# Patient Record
Sex: FEMALE | Race: ASIAN | Employment: FULL TIME | URBAN - METROPOLITAN AREA
[De-identification: names, ages, dates, MRNs, and addresses within clinical notes are randomized per-mention and may not be internally consistent; named-entity substitution may affect disease eponyms.]

---

## 2017-05-09 ENCOUNTER — PATIENT MESSAGE (OUTPATIENT)
Dept: FAMILY MEDICINE CLINIC | Facility: CLINIC | Age: 37
End: 2017-05-09

## 2017-05-09 DIAGNOSIS — E04.9 GOITER: Primary | ICD-10-CM

## 2017-05-09 NOTE — TELEPHONE ENCOUNTER
From: Ata Dunlap  To: Susanne Cox DO  Sent: 5/9/2017 7:27 AM CDT  Subject: Prescription Question    Hi Dr Katerine Casillas  I had stopped taking my thyroid medicine 2-3 months ago since I was feeling alright.  But now again I feel fatigued , low on energy etc .

## 2017-05-09 NOTE — TELEPHONE ENCOUNTER
From: Laury Graves  To: Kiet Garcia DO  Sent: 5/9/2017 12:01 PM CDT  Subject: Prescription Question    Hi Dr Siri Camara   I think nurse [facility] replied saying that I should not have stopped medication for thyroid.    You had told me when we started that we can t

## 2017-05-09 NOTE — TELEPHONE ENCOUNTER
Hi Dr Eleni Claude   I think nurse Mari Hathaway replied saying that I should not have stopped medication for thyroid. You had told me when we started that we can try to stop and see how it goes . So that's why I stopped .     Since I was not feeling alright that's why

## 2017-05-10 RX ORDER — LEVOTHYROXINE SODIUM 0.03 MG/1
TABLET ORAL
Qty: 90 TABLET | Refills: 3 | Status: SHIPPED | OUTPATIENT
Start: 2017-05-10 | End: 2018-05-17

## 2017-07-14 ENCOUNTER — PATIENT MESSAGE (OUTPATIENT)
Dept: FAMILY MEDICINE CLINIC | Facility: CLINIC | Age: 37
End: 2017-07-14

## 2017-07-14 NOTE — ADDENDUM NOTE
Encounter addended by: Chandni Lemus MA on: 7/14/2017  8:41 AM<BR>    Actions taken: Letter status changed

## 2017-07-17 NOTE — TELEPHONE ENCOUNTER
From: Duncan Holbrook  To: Brendon Delgado DO  Sent: 7/14/2017 5:12 PM CDT  Subject: Other    Hi Dr Cintia Sampson  I have my physical annual scheduled for next Thursday. What is the earliest next date available incase I need to reschedule. Let me know .   Thanks   P

## 2017-07-20 ENCOUNTER — OFFICE VISIT (OUTPATIENT)
Dept: FAMILY MEDICINE CLINIC | Facility: CLINIC | Age: 37
End: 2017-07-20

## 2017-07-20 VITALS
RESPIRATION RATE: 20 BRPM | HEIGHT: 63 IN | TEMPERATURE: 99 F | SYSTOLIC BLOOD PRESSURE: 100 MMHG | HEART RATE: 68 BPM | WEIGHT: 130 LBS | BODY MASS INDEX: 23.04 KG/M2 | DIASTOLIC BLOOD PRESSURE: 60 MMHG

## 2017-07-20 DIAGNOSIS — Z00.00 ANNUAL PHYSICAL EXAM: Primary | ICD-10-CM

## 2017-07-20 DIAGNOSIS — G44.009 CLUSTER HEADACHE, NOT INTRACTABLE, UNSPECIFIED CHRONICITY PATTERN: ICD-10-CM

## 2017-07-20 DIAGNOSIS — K30 INDIGESTION: ICD-10-CM

## 2017-07-20 PROCEDURE — 99395 PREV VISIT EST AGE 18-39: CPT | Performed by: FAMILY MEDICINE

## 2017-07-20 RX ORDER — FLUTICASONE PROPIONATE 50 MCG
2 SPRAY, SUSPENSION (ML) NASAL NIGHTLY
Qty: 1 BOTTLE | Refills: 0 | Status: SHIPPED | OUTPATIENT
Start: 2017-07-20 | End: 2017-09-13

## 2017-07-20 NOTE — PROGRESS NOTES
HPI:   Sarahi Osuna is a 29year old female who presents for a complete physical exam.     Wt Readings from Last 6 Encounters:  07/20/17 : 130 lb  08/12/16 : 125 lb  07/22/16 : 128 lb  04/01/16 : 133 lb  11/06/15 : 134 lb  08/14/15 : 134 lb    Body mass ind lesions  EYES:denies blurred vision or double vision  HEENT: denies nasal congestion, sinus pain or ST  LUNGS: denies shortness of breath with exertion  CARDIOVASCULAR: denies chest pain on exertion  GI: denies abdominal pain,denies heartburn  : denies d Indigestion  zanatac as needed / go gluten free         Discussed diet, exercise,calcium, vitamin D, fish oil and self breast exams. Questions answered and patient indicates understanding of these issues and agrees to the plan.   Follow up in 1 year or so

## 2017-07-22 ENCOUNTER — LABORATORY ENCOUNTER (OUTPATIENT)
Dept: LAB | Age: 37
End: 2017-07-22
Attending: FAMILY MEDICINE
Payer: COMMERCIAL

## 2017-07-22 DIAGNOSIS — E04.9 GOITER: ICD-10-CM

## 2017-07-22 DIAGNOSIS — Z00.00 ANNUAL PHYSICAL EXAM: ICD-10-CM

## 2017-07-22 LAB
25-HYDROXYVITAMIN D (TOTAL): 34.3 NG/ML (ref 30–100)
ALBUMIN SERPL-MCNC: 4.2 G/DL (ref 3.5–4.8)
ALP LIVER SERPL-CCNC: 65 U/L (ref 37–98)
ALT SERPL-CCNC: 25 U/L (ref 14–54)
ANTI-THYROGLOBULIN: <15 U/ML (ref ?–60)
ANTI-THYROPEROXIDASE: <28 U/ML (ref ?–60)
AST SERPL-CCNC: 17 U/L (ref 15–41)
BASOPHILS # BLD AUTO: 0.02 X10(3) UL (ref 0–0.1)
BASOPHILS NFR BLD AUTO: 0.5 %
BILIRUB SERPL-MCNC: 0.7 MG/DL (ref 0.1–2)
BUN BLD-MCNC: 12 MG/DL (ref 8–20)
CALCIUM BLD-MCNC: 9.4 MG/DL (ref 8.3–10.3)
CHLORIDE: 107 MMOL/L (ref 101–111)
CHOLEST SMN-MCNC: 177 MG/DL (ref ?–200)
CO2: 28 MMOL/L (ref 22–32)
CREAT BLD-MCNC: 0.72 MG/DL (ref 0.55–1.02)
EOSINOPHIL # BLD AUTO: 0.16 X10(3) UL (ref 0–0.3)
EOSINOPHIL NFR BLD AUTO: 3.6 %
ERYTHROCYTE [DISTWIDTH] IN BLOOD BY AUTOMATED COUNT: 13.7 % (ref 11.5–16)
FREE T4: 1.3 NG/DL (ref 0.9–1.8)
GLUCOSE BLD-MCNC: 78 MG/DL (ref 70–99)
HAV AB SERPL IA-ACNC: 398 PG/ML (ref 193–986)
HCT VFR BLD AUTO: 40.1 % (ref 34–50)
HDLC SERPL-MCNC: 68 MG/DL (ref 45–?)
HDLC SERPL: 2.6 {RATIO} (ref ?–4.44)
HGB BLD-MCNC: 12.8 G/DL (ref 12–16)
IMMATURE GRANULOCYTE COUNT: 0.01 X10(3) UL (ref 0–1)
IMMATURE GRANULOCYTE RATIO %: 0.2 %
LDLC SERPL CALC-MCNC: 100 MG/DL (ref ?–130)
LDLC SERPL-MCNC: 9 MG/DL (ref 5–40)
LYMPHOCYTES # BLD AUTO: 1.68 X10(3) UL (ref 0.9–4)
LYMPHOCYTES NFR BLD AUTO: 37.8 %
M PROTEIN MFR SERPL ELPH: 7.5 G/DL (ref 6.1–8.3)
MCH RBC QN AUTO: 27.9 PG (ref 27–33.2)
MCHC RBC AUTO-ENTMCNC: 31.9 G/DL (ref 31–37)
MCV RBC AUTO: 87.6 FL (ref 81–100)
MONOCYTES # BLD AUTO: 0.38 X10(3) UL (ref 0.1–0.6)
MONOCYTES NFR BLD AUTO: 8.6 %
NEUTROPHIL ABS PRELIM: 2.19 X10 (3) UL (ref 1.3–6.7)
NEUTROPHILS # BLD AUTO: 2.19 X10(3) UL (ref 1.3–6.7)
NEUTROPHILS NFR BLD AUTO: 49.3 %
NONHDLC SERPL-MCNC: 109 MG/DL (ref ?–130)
PLATELET # BLD AUTO: 258 10(3)UL (ref 150–450)
POTASSIUM SERPL-SCNC: 4.5 MMOL/L (ref 3.6–5.1)
RBC # BLD AUTO: 4.58 X10(6)UL (ref 3.8–5.1)
RED CELL DISTRIBUTION WIDTH-SD: 44.1 FL (ref 35.1–46.3)
SODIUM SERPL-SCNC: 141 MMOL/L (ref 136–144)
T3FREE SERPL-MCNC: 2.49 PG/ML (ref 2.3–4.2)
TRIGLYCERIDES: 45 MG/DL (ref ?–150)
TSI SER-ACNC: 3.23 MIU/ML (ref 0.35–5.5)
WBC # BLD AUTO: 4.4 X10(3) UL (ref 4–13)

## 2017-07-22 PROCEDURE — 84439 ASSAY OF FREE THYROXINE: CPT

## 2017-07-22 PROCEDURE — 86376 MICROSOMAL ANTIBODY EACH: CPT

## 2017-07-22 PROCEDURE — 82607 VITAMIN B-12: CPT

## 2017-07-22 PROCEDURE — 84443 ASSAY THYROID STIM HORMONE: CPT

## 2017-07-22 PROCEDURE — 36415 COLL VENOUS BLD VENIPUNCTURE: CPT

## 2017-07-22 PROCEDURE — 84481 FREE ASSAY (FT-3): CPT

## 2017-07-22 PROCEDURE — 86800 THYROGLOBULIN ANTIBODY: CPT

## 2017-07-22 PROCEDURE — 82306 VITAMIN D 25 HYDROXY: CPT

## 2017-07-22 PROCEDURE — 80053 COMPREHEN METABOLIC PANEL: CPT

## 2017-07-22 PROCEDURE — 80061 LIPID PANEL: CPT

## 2017-07-22 PROCEDURE — 85025 COMPLETE CBC W/AUTO DIFF WBC: CPT

## 2017-08-05 NOTE — LETTER
Date & Time: 2/10/2025, 6:34 PM  Patient: Audra Brown  Encounter Provider(s):    Mary Yang APRN       To Whom It May Concern:    Audra Brown was seen and treated in our department on 2/10/2025. She should not return to work until 02/12/2025 .    If you have any questions or concerns, please do not hesitate to call.        Mary Yang NP-C  Nurse Practitioner    This note has been electronically signed             
Home

## 2017-09-13 ENCOUNTER — OFFICE VISIT (OUTPATIENT)
Dept: FAMILY MEDICINE CLINIC | Facility: CLINIC | Age: 37
End: 2017-09-13

## 2017-09-13 VITALS
SYSTOLIC BLOOD PRESSURE: 102 MMHG | OXYGEN SATURATION: 97 % | DIASTOLIC BLOOD PRESSURE: 66 MMHG | HEART RATE: 87 BPM | WEIGHT: 130 LBS | HEIGHT: 63 IN | TEMPERATURE: 99 F | BODY MASS INDEX: 23.04 KG/M2 | RESPIRATION RATE: 18 BRPM

## 2017-09-13 DIAGNOSIS — J01.00 ACUTE MAXILLARY SINUSITIS, RECURRENCE NOT SPECIFIED: ICD-10-CM

## 2017-09-13 DIAGNOSIS — S46.811A STRAIN OF RIGHT TRAPEZIUS MUSCLE, INITIAL ENCOUNTER: Primary | ICD-10-CM

## 2017-09-13 DIAGNOSIS — R51.9 ACUTE NONINTRACTABLE HEADACHE, UNSPECIFIED HEADACHE TYPE: ICD-10-CM

## 2017-09-13 PROCEDURE — 99214 OFFICE O/P EST MOD 30 MIN: CPT | Performed by: PHYSICIAN ASSISTANT

## 2017-09-13 RX ORDER — NAPROXEN 500 MG/1
500 TABLET ORAL 2 TIMES DAILY WITH MEALS
Qty: 30 TABLET | Refills: 0 | Status: SHIPPED | OUTPATIENT
Start: 2017-09-13 | End: 2018-07-11

## 2017-09-13 RX ORDER — AMOXICILLIN AND CLAVULANATE POTASSIUM 875; 125 MG/1; MG/1
1 TABLET, FILM COATED ORAL 2 TIMES DAILY
Qty: 20 TABLET | Refills: 0 | Status: SHIPPED | OUTPATIENT
Start: 2017-09-13 | End: 2017-09-23

## 2017-09-13 RX ORDER — FLUTICASONE PROPIONATE 50 MCG
2 SPRAY, SUSPENSION (ML) NASAL NIGHTLY
Qty: 1 BOTTLE | Refills: 0 | Status: SHIPPED | OUTPATIENT
Start: 2017-09-13 | End: 2018-07-11

## 2017-09-13 RX ORDER — CYCLOBENZAPRINE HCL 5 MG
5 TABLET ORAL NIGHTLY
Qty: 10 TABLET | Refills: 0 | Status: SHIPPED | OUTPATIENT
Start: 2017-09-13 | End: 2018-07-11

## 2017-09-13 NOTE — PROGRESS NOTES
HPI:   Paz Ramirez is a 29year old female who presents for a headache. Had headache that started last night. Located back of her head; worse when she bends her head forward. Can feel a stretch in her neck and upper back.  Also has pain under both of her ea 15   08/15/2015 15   05/10/2014 15   ----------  ALT (U/L)   Date Value   05/10/2014 24   ----------  Alt (U/L)   Date Value   07/22/2017 25 08/12/2016 18   08/15/2015 20   ----------        Current Outpatient Prescriptions:  Levothyroxine Sodium 25 MCG 3, well developed, well nourished,in no apparent distress  CARDIO: RRR without murmur, radial pulse 2+ bilaterally  LUNGS: clear to auscultation  NECK: supple,no adenopathy, FROM cervical spine, +tenderness right trapezius muscle and at insertion points on relaxant. Ice or heat  Call in 2 days; sooner or go to the IC if headache worsens or changes. Questions answered and patient indicates understanding of these issues and agrees to the plan.   Call Friday for condition update or sooner if symptoms worsen

## 2017-09-15 ENCOUNTER — PATIENT MESSAGE (OUTPATIENT)
Dept: FAMILY MEDICINE CLINIC | Facility: CLINIC | Age: 37
End: 2017-09-15

## 2017-09-15 DIAGNOSIS — R51.9 INTRACTABLE HEADACHE, UNSPECIFIED CHRONICITY PATTERN, UNSPECIFIED HEADACHE TYPE: Primary | ICD-10-CM

## 2017-09-18 NOTE — TELEPHONE ENCOUNTER
Order MRI brain without contrast for headache. Go to the IC/ER if symptoms worsen or change. Follow up in 2 days.

## 2017-09-18 NOTE — TELEPHONE ENCOUNTER
From: Araceli Tovar  To: Rachel Clarke [place]  Sent: 9/15/2017 5:40 PM CDT  Subject: Visit Follow-up Question    Hi Dr Narcisa Gabriel    My aches at the back of head are a little better . But I m still not feeling 100%.  I feel heavy headed(on a scale of 1-10, I wou

## 2017-09-22 ENCOUNTER — PATIENT MESSAGE (OUTPATIENT)
Dept: FAMILY MEDICINE CLINIC | Facility: CLINIC | Age: 37
End: 2017-09-22

## 2017-09-22 NOTE — TELEPHONE ENCOUNTER
From: Jeniffer Batista  To: Paresh Ladd  Sent: 9/22/2017 10:16 AM CDT  Subject: Visit Follow-up Question    Hi Dr. Karen Moon     I am feeling much better now after this whole week of medicine.   I haven't got a chance to schedule MRI yet due to my work dayanna

## 2017-09-29 ENCOUNTER — HOSPITAL ENCOUNTER (OUTPATIENT)
Dept: MRI IMAGING | Age: 37
Discharge: HOME | End: 2017-09-29
Attending: PHYSICIAN ASSISTANT
Payer: COMMERCIAL

## 2017-09-29 ENCOUNTER — PATIENT MESSAGE (OUTPATIENT)
Dept: FAMILY MEDICINE CLINIC | Facility: CLINIC | Age: 37
End: 2017-09-29

## 2017-09-29 DIAGNOSIS — R51.9 INTRACTABLE HEADACHE, UNSPECIFIED CHRONICITY PATTERN, UNSPECIFIED HEADACHE TYPE: ICD-10-CM

## 2017-09-29 PROCEDURE — 70551 MRI BRAIN STEM W/O DYE: CPT | Performed by: PHYSICIAN ASSISTANT

## 2017-10-02 NOTE — TELEPHONE ENCOUNTER
From: Elke Amin  To: WILLEM Varner  Sent: 9/29/2017 4:52 PM CDT  Subject: Visit Follow-up Question    I have a question about MRI BRAIN (WITHOUT CONTRAST) (CPT=70551) resulted on 9/29/17, 2:28 PM.    What should I do about headaches then ?   Advil

## 2017-10-12 ENCOUNTER — PATIENT MESSAGE (OUTPATIENT)
Dept: FAMILY MEDICINE CLINIC | Facility: CLINIC | Age: 37
End: 2017-10-12

## 2017-10-13 NOTE — TELEPHONE ENCOUNTER
From: Alicia Santiago  To: WILLEM Zapata  Sent: 10/12/2017 4:44 PM CDT  Subject: Test Results Question    I have a question about MRI BRAIN (WITHOUT CONTRAST) (CPT=70551) resulted on 9/29/17, 2:28 PM.    Hi Dr Dom Adamson   I need a written explanation from

## 2017-10-13 NOTE — TELEPHONE ENCOUNTER
Already spoke with patient's insurance and did peer to peer regarding this to get it approved. Did MRI due to occipital headache; wanted to evaluate for AVM or other neurovascular causes of her headache.

## 2018-05-17 DIAGNOSIS — E04.9 GOITER: ICD-10-CM

## 2018-05-17 RX ORDER — LEVOTHYROXINE SODIUM 0.03 MG/1
TABLET ORAL
Qty: 90 TABLET | Refills: 1 | Status: SHIPPED | OUTPATIENT
Start: 2018-05-17 | End: 2018-08-03

## 2018-07-11 ENCOUNTER — HOSPITAL ENCOUNTER (OUTPATIENT)
Dept: GENERAL RADIOLOGY | Age: 38
Discharge: HOME | End: 2018-07-11
Attending: PHYSICIAN ASSISTANT
Payer: COMMERCIAL

## 2018-07-11 ENCOUNTER — LAB ENCOUNTER (OUTPATIENT)
Dept: LAB | Age: 38
End: 2018-07-11
Attending: PHYSICIAN ASSISTANT
Payer: COMMERCIAL

## 2018-07-11 ENCOUNTER — TELEPHONE (OUTPATIENT)
Dept: FAMILY MEDICINE CLINIC | Facility: CLINIC | Age: 38
End: 2018-07-11

## 2018-07-11 ENCOUNTER — OFFICE VISIT (OUTPATIENT)
Dept: FAMILY MEDICINE CLINIC | Facility: CLINIC | Age: 38
End: 2018-07-11

## 2018-07-11 VITALS
OXYGEN SATURATION: 99 % | WEIGHT: 132 LBS | HEART RATE: 68 BPM | RESPIRATION RATE: 18 BRPM | TEMPERATURE: 98 F | HEIGHT: 63 IN | DIASTOLIC BLOOD PRESSURE: 60 MMHG | BODY MASS INDEX: 23.39 KG/M2 | SYSTOLIC BLOOD PRESSURE: 104 MMHG

## 2018-07-11 DIAGNOSIS — R10.9 RIGHT SIDED ABDOMINAL PAIN: ICD-10-CM

## 2018-07-11 DIAGNOSIS — R14.0 BLOATING: ICD-10-CM

## 2018-07-11 DIAGNOSIS — K59.09 OTHER CONSTIPATION: ICD-10-CM

## 2018-07-11 DIAGNOSIS — R10.9 RIGHT SIDED ABDOMINAL PAIN: Primary | ICD-10-CM

## 2018-07-11 DIAGNOSIS — R10.2 PELVIC PAIN IN FEMALE: ICD-10-CM

## 2018-07-11 LAB
ALBUMIN SERPL-MCNC: 3.9 G/DL (ref 3.5–4.8)
ALP LIVER SERPL-CCNC: 67 U/L (ref 37–98)
ALT SERPL-CCNC: 24 U/L (ref 14–54)
APPEARANCE: CLEAR
AST SERPL-CCNC: 20 U/L (ref 15–41)
BASOPHILS # BLD AUTO: 0.02 X10(3) UL (ref 0–0.1)
BASOPHILS NFR BLD AUTO: 0.3 %
BILIRUB SERPL-MCNC: 0.4 MG/DL (ref 0.1–2)
BILIRUBIN: NEGATIVE
BUN BLD-MCNC: 11 MG/DL (ref 8–20)
CALCIUM BLD-MCNC: 8.9 MG/DL (ref 8.3–10.3)
CHLORIDE: 103 MMOL/L (ref 101–111)
CO2: 27 MMOL/L (ref 22–32)
CREAT BLD-MCNC: 0.76 MG/DL (ref 0.55–1.02)
EOSINOPHIL # BLD AUTO: 0.22 X10(3) UL (ref 0–0.3)
EOSINOPHIL NFR BLD AUTO: 3.3 %
ERYTHROCYTE [DISTWIDTH] IN BLOOD BY AUTOMATED COUNT: 12.9 % (ref 11.5–16)
GLUCOSE (URINE DIPSTICK): NEGATIVE MG/DL
GLUCOSE BLD-MCNC: 82 MG/DL (ref 70–99)
HCT VFR BLD AUTO: 39.7 % (ref 34–50)
HGB BLD-MCNC: 12.5 G/DL (ref 12–16)
IMMATURE GRANULOCYTE COUNT: 0.02 X10(3) UL (ref 0–1)
IMMATURE GRANULOCYTE RATIO %: 0.3 %
KETONES (URINE DIPSTICK): NEGATIVE MG/DL
LEUKOCYTES: NEGATIVE
LIPASE: 141 U/L (ref 73–393)
LYMPHOCYTES # BLD AUTO: 2.08 X10(3) UL (ref 0.9–4)
LYMPHOCYTES NFR BLD AUTO: 31.7 %
M PROTEIN MFR SERPL ELPH: 7.5 G/DL (ref 6.1–8.3)
MCH RBC QN AUTO: 28.2 PG (ref 27–33.2)
MCHC RBC AUTO-ENTMCNC: 31.5 G/DL (ref 31–37)
MCV RBC AUTO: 89.4 FL (ref 81–100)
MONOCYTES # BLD AUTO: 0.54 X10(3) UL (ref 0.1–1)
MONOCYTES NFR BLD AUTO: 8.2 %
MULTISTIX LOT#: NORMAL NUMERIC
NEUTROPHIL ABS PRELIM: 3.69 X10 (3) UL (ref 1.3–6.7)
NEUTROPHILS # BLD AUTO: 3.69 X10(3) UL (ref 1.3–6.7)
NEUTROPHILS NFR BLD AUTO: 56.2 %
NITRITE, URINE: NEGATIVE
OCCULT BLOOD: NEGATIVE
PH, URINE: 5.5 (ref 4.5–8)
PLATELET # BLD AUTO: 273 10(3)UL (ref 150–450)
POTASSIUM SERPL-SCNC: 4.3 MMOL/L (ref 3.6–5.1)
PROTEIN (URINE DIPSTICK): NEGATIVE MG/DL
RBC # BLD AUTO: 4.44 X10(6)UL (ref 3.8–5.1)
RED CELL DISTRIBUTION WIDTH-SD: 42.5 FL (ref 35.1–46.3)
SODIUM SERPL-SCNC: 136 MMOL/L (ref 136–144)
SPECIFIC GRAVITY: 1.02 (ref 1–1.03)
TSI SER-ACNC: 3.37 MIU/ML (ref 0.35–5.5)
URINE-COLOR: YELLOW
UROBILINOGEN,SEMI-QN: 0.2 MG/DL (ref 0–1.9)
WBC # BLD AUTO: 6.6 X10(3) UL (ref 4–13)

## 2018-07-11 PROCEDURE — 74018 RADEX ABDOMEN 1 VIEW: CPT | Performed by: PHYSICIAN ASSISTANT

## 2018-07-11 PROCEDURE — 36415 COLL VENOUS BLD VENIPUNCTURE: CPT | Performed by: PHYSICIAN ASSISTANT

## 2018-07-11 PROCEDURE — 83690 ASSAY OF LIPASE: CPT | Performed by: PHYSICIAN ASSISTANT

## 2018-07-11 PROCEDURE — 99214 OFFICE O/P EST MOD 30 MIN: CPT | Performed by: PHYSICIAN ASSISTANT

## 2018-07-11 PROCEDURE — 81003 URINALYSIS AUTO W/O SCOPE: CPT | Performed by: PHYSICIAN ASSISTANT

## 2018-07-11 PROCEDURE — 80050 GENERAL HEALTH PANEL: CPT | Performed by: PHYSICIAN ASSISTANT

## 2018-07-11 NOTE — PROGRESS NOTES
HPI:   Steve Ferrer is a 40year old female who presents for pelvic pain. Pt is established with gynecology and her last visit was in May of this year. Has had pelvic pain for 2-3 weeks. No urinary symptoms. Denies vaginal discharge.  States her pain is on h ----------        Current Outpatient Prescriptions:  LEVOTHYROXINE SODIUM 25 MCG Oral Tab TAKE 1 TABLET BY MOUTH DAILY BEFORE BREAKFAST Disp: 90 tablet Rfl: 1   Cholecalciferol (VITAMIN D3) 70908 UNITS Oral Cap Take 1 tablet by mouth.  Disp:  Rfl:    Lidia ecchymosis, no swelling, no inguinal adenopathy, declined pelvic exam    ASSESSMENT AND PLAN:   Jonas Tubbs is a 40year old female  1.  Right sided abdominal pain  Urine unremarkable for urinary tract infection  Labs ordered  Xray today  ultrasound ordered

## 2018-07-16 ENCOUNTER — HOSPITAL ENCOUNTER (OUTPATIENT)
Dept: ULTRASOUND IMAGING | Age: 38
Discharge: HOME | End: 2018-07-16
Attending: PHYSICIAN ASSISTANT
Payer: COMMERCIAL

## 2018-07-16 DIAGNOSIS — R10.9 RIGHT SIDED ABDOMINAL PAIN: ICD-10-CM

## 2018-07-16 PROCEDURE — 76700 US EXAM ABDOM COMPLETE: CPT | Performed by: PHYSICIAN ASSISTANT

## 2018-08-03 ENCOUNTER — OFFICE VISIT (OUTPATIENT)
Dept: FAMILY MEDICINE CLINIC | Facility: CLINIC | Age: 38
End: 2018-08-03
Payer: COMMERCIAL

## 2018-08-03 VITALS
SYSTOLIC BLOOD PRESSURE: 102 MMHG | WEIGHT: 134 LBS | BODY MASS INDEX: 24.04 KG/M2 | DIASTOLIC BLOOD PRESSURE: 60 MMHG | OXYGEN SATURATION: 99 % | RESPIRATION RATE: 20 BRPM | HEART RATE: 68 BPM | HEIGHT: 62.5 IN | TEMPERATURE: 99 F

## 2018-08-03 DIAGNOSIS — Z00.00 ANNUAL PHYSICAL EXAM: ICD-10-CM

## 2018-08-03 DIAGNOSIS — E55.9 VITAMIN D DEFICIENCY: Primary | ICD-10-CM

## 2018-08-03 DIAGNOSIS — E03.9 HYPOTHYROIDISM, UNSPECIFIED TYPE: ICD-10-CM

## 2018-08-03 DIAGNOSIS — K59.09 CHRONIC CONSTIPATION: ICD-10-CM

## 2018-08-03 PROBLEM — G44.009 CLUSTER HEADACHE, NOT INTRACTABLE: Status: RESOLVED | Noted: 2017-07-20 | Resolved: 2018-08-03

## 2018-08-03 PROCEDURE — 99395 PREV VISIT EST AGE 18-39: CPT | Performed by: FAMILY MEDICINE

## 2018-08-03 RX ORDER — LEVOTHYROXINE SODIUM 0.03 MG/1
TABLET ORAL
Qty: 90 TABLET | Refills: 3 | Status: SHIPPED | OUTPATIENT
Start: 2018-08-03 | End: 2021-02-15 | Stop reason: DRUGHIGH

## 2018-08-03 NOTE — PROGRESS NOTES
HPI:   Elke Amin is a 40year old female who presents for a complete physical exam.     Wt Readings from Last 6 Encounters:  08/03/18 : 134 lb  07/11/18 : 132 lb  09/13/17 : 130 lb  07/20/17 : 130 lb  08/12/16 : 125 lb  07/22/16 : 128 lb    Body mass ind nasal congestion, sinus pain or ST  LUNGS: denies shortness of breath with exertion  CARDIOVASCULAR: denies chest pain on exertion  GI: denies abdominal pain,denies heartburn  : denies dysuria, vaginal discharge or itching,periods regular   MUSCULOSKELET

## 2018-08-21 ENCOUNTER — APPOINTMENT (OUTPATIENT)
Dept: LAB | Age: 38
End: 2018-08-21
Attending: FAMILY MEDICINE
Payer: COMMERCIAL

## 2018-08-21 ENCOUNTER — TELEPHONE (OUTPATIENT)
Dept: FAMILY MEDICINE CLINIC | Facility: CLINIC | Age: 38
End: 2018-08-21

## 2018-08-21 DIAGNOSIS — Z00.00 ANNUAL PHYSICAL EXAM: ICD-10-CM

## 2018-08-21 LAB
CHOLEST SMN-MCNC: 165 MG/DL (ref ?–200)
HAV AB SERPL IA-ACNC: 270 PG/ML (ref 193–986)
HDLC SERPL-MCNC: 57 MG/DL (ref 40–59)
LDLC SERPL CALC-MCNC: 95 MG/DL (ref ?–100)
NONHDLC SERPL-MCNC: 108 MG/DL (ref ?–130)
TRIGL SERPL-MCNC: 63 MG/DL (ref 30–149)
VIT D+METAB SERPL-MCNC: 27 NG/ML (ref 30–100)
VLDLC SERPL CALC-MCNC: 13 MG/DL (ref 0–30)

## 2018-08-21 PROCEDURE — 82306 VITAMIN D 25 HYDROXY: CPT | Performed by: FAMILY MEDICINE

## 2018-08-21 PROCEDURE — 82607 VITAMIN B-12: CPT | Performed by: FAMILY MEDICINE

## 2018-08-21 PROCEDURE — 36415 COLL VENOUS BLD VENIPUNCTURE: CPT | Performed by: FAMILY MEDICINE

## 2018-08-21 PROCEDURE — 80061 LIPID PANEL: CPT | Performed by: FAMILY MEDICINE

## 2018-08-24 ENCOUNTER — PATIENT MESSAGE (OUTPATIENT)
Dept: FAMILY MEDICINE CLINIC | Facility: CLINIC | Age: 38
End: 2018-08-24

## 2018-08-27 NOTE — TELEPHONE ENCOUNTER
To confirm,  Is it okay to wait only a half hour after taking thyroid med and eating? How long should pt wait before taking other vitamins?

## 2018-08-27 NOTE — TELEPHONE ENCOUNTER
From: Tanner Berrios  To: Aayush Gray DO  Sent: 8/24/2018 5:15 PM CDT  Subject: Test Results Question    Hi Dr Marco A Coats    I have been missing on my vitamins. I take it once or twice a month that's it. Sorry about that.  I will start it back again from [facility]

## 2020-01-31 ENCOUNTER — OFFICE VISIT (OUTPATIENT)
Dept: FAMILY MEDICINE CLINIC | Facility: CLINIC | Age: 40
End: 2020-01-31
Payer: COMMERCIAL

## 2020-01-31 VITALS
HEART RATE: 64 BPM | BODY MASS INDEX: 26.38 KG/M2 | DIASTOLIC BLOOD PRESSURE: 82 MMHG | TEMPERATURE: 99 F | RESPIRATION RATE: 16 BRPM | WEIGHT: 147 LBS | OXYGEN SATURATION: 98 % | SYSTOLIC BLOOD PRESSURE: 122 MMHG | HEIGHT: 62.5 IN

## 2020-01-31 DIAGNOSIS — Z00.00 ANNUAL PHYSICAL EXAM: Primary | ICD-10-CM

## 2020-01-31 DIAGNOSIS — R68.89 FLU-LIKE SYMPTOMS: ICD-10-CM

## 2020-01-31 PROCEDURE — 99395 PREV VISIT EST AGE 18-39: CPT | Performed by: FAMILY MEDICINE

## 2020-01-31 RX ORDER — OSELTAMIVIR PHOSPHATE 75 MG/1
75 CAPSULE ORAL 2 TIMES DAILY
Qty: 10 CAPSULE | Refills: 0 | Status: SHIPPED | OUTPATIENT
Start: 2020-01-31 | End: 2020-10-09 | Stop reason: ALTCHOICE

## 2020-01-31 RX ORDER — PNV NO.95/FERROUS FUM/FOLIC AC 28MG-0.8MG
325 TABLET ORAL
COMMUNITY
End: 2020-10-09 | Stop reason: ALTCHOICE

## 2020-01-31 NOTE — PROGRESS NOTES
HPI:   Doug Clayton is a 46year old female who presents for a complete physical exam.     Wt Readings from Last 6 Encounters:  01/31/20 : 147 lb (66.7 kg)  08/03/18 : 134 lb (60.8 kg)  07/11/18 : 132 lb (59.9 kg)  09/13/17 : 130 lb (59 kg)  07/20/17 : 130 Never Smoker      Smokeless tobacco: Never Used    Alcohol use: Yes    Drug use: No    Occ:. :  Children: 3   /  Going back in 2 weeks   Exercise: none.   Diet: watches calories closely     REVIEW OF SYSTEMS:   GENERAL: feels well oth Future  - Oseltamivir Phosphate (TAMIFLU) 75 MG Oral Cap; Take 1 capsule (75 mg total) by mouth 2 (two) times daily. Dispense: 10 capsule; Refill: 0        Discussed diet, exercise,calcium, vitamin D, fish oil and self breast exams.    Questions answered a

## 2020-02-01 ENCOUNTER — PATIENT MESSAGE (OUTPATIENT)
Dept: FAMILY MEDICINE CLINIC | Facility: CLINIC | Age: 40
End: 2020-02-01

## 2020-02-01 LAB
Lab: NOT DETECTED

## 2020-02-03 NOTE — TELEPHONE ENCOUNTER
From: Luis Joseph  To: Kenny Nathan DO  Sent: 2/1/2020 11:40 AM CST  Subject: Test Results Question    I have a question about TEST IN QUESTION - MISC QUESTION resulted on 2/1/20, 6:54 AM.    Hi Dr. Patricia Garcia  I can't understand the test results in my chart

## 2020-02-04 LAB
ABSOLUTE BASOPHILS: 20 CELLS/UL (ref 0–200)
ABSOLUTE EOSINOPHILS: 348 CELLS/UL (ref 15–500)
ABSOLUTE LYMPHOCYTES: 1940 CELLS/UL (ref 850–3900)
ABSOLUTE MONOCYTES: 510 CELLS/UL (ref 200–950)
ABSOLUTE NEUTROPHILS: 2083 CELLS/UL (ref 1500–7800)
ALBUMIN/GLOBULIN RATIO: 1.7 (CALC) (ref 1–2.5)
ALBUMIN: 4.1 G/DL (ref 3.6–5.1)
ALKALINE PHOSPHATASE: 101 U/L (ref 31–125)
ALT: 27 U/L (ref 6–29)
AST: 19 U/L (ref 10–30)
BASOPHILS: 0.4 %
BILIRUBIN, TOTAL: 0.5 MG/DL (ref 0.2–1.2)
BUN: 17 MG/DL (ref 7–25)
CALCIUM: 9.5 MG/DL (ref 8.6–10.2)
CARBON DIOXIDE: 27 MMOL/L (ref 20–32)
CHLORIDE: 103 MMOL/L (ref 98–110)
CHOL/HDLC RATIO: 2.5 (CALC)
CHOLESTEROL, TOTAL: 175 MG/DL
CREATININE: 0.71 MG/DL (ref 0.5–1.1)
EGFR IF AFRICN AM: 125 ML/MIN/1.73M2
EGFR IF NONAFRICN AM: 108 ML/MIN/1.73M2
EOSINOPHILS: 7.1 %
GLOBULIN: 2.4 G/DL (CALC) (ref 1.9–3.7)
GLUCOSE: 79 MG/DL (ref 65–99)
HDL CHOLESTEROL: 70 MG/DL
HEMATOCRIT: 40.6 % (ref 35–45)
HEMOGLOBIN: 13.8 G/DL (ref 11.7–15.5)
LDL-CHOLESTEROL: 91 MG/DL (CALC)
LYMPHOCYTES: 39.6 %
MCH: 30.5 PG (ref 27–33)
MCHC: 34 G/DL (ref 32–36)
MCV: 89.6 FL (ref 80–100)
MONOCYTES: 10.4 %
MPV: 10.5 FL (ref 7.5–12.5)
NEUTROPHILS: 42.5 %
NON-HDL CHOLESTEROL: 105 MG/DL (CALC)
PLATELET COUNT: 252 THOUSAND/UL (ref 140–400)
POTASSIUM: 4.5 MMOL/L (ref 3.5–5.3)
PROTEIN, TOTAL: 6.5 G/DL (ref 6.1–8.1)
RDW: 11.8 % (ref 11–15)
RED BLOOD CELL COUNT: 4.53 MILLION/UL (ref 3.8–5.1)
SODIUM: 138 MMOL/L (ref 135–146)
T4, FREE: 1.3 NG/DL (ref 0.8–1.8)
TRIGLYCERIDES: 53 MG/DL
TSH: 2.2 MIU/L
VITAMIN B12: 875 PG/ML (ref 200–1100)
VITAMIN D, 25-OH, TOTAL: 39 NG/ML (ref 30–100)
WHITE BLOOD CELL COUNT: 4.9 THOUSAND/UL (ref 3.8–10.8)

## 2021-02-04 ENCOUNTER — OFFICE VISIT (OUTPATIENT)
Dept: FAMILY MEDICINE CLINIC | Facility: CLINIC | Age: 41
End: 2021-02-04
Payer: COMMERCIAL

## 2021-02-04 VITALS
SYSTOLIC BLOOD PRESSURE: 100 MMHG | DIASTOLIC BLOOD PRESSURE: 60 MMHG | HEIGHT: 62 IN | BODY MASS INDEX: 25.58 KG/M2 | OXYGEN SATURATION: 99 % | RESPIRATION RATE: 18 BRPM | WEIGHT: 139 LBS | HEART RATE: 91 BPM

## 2021-02-04 DIAGNOSIS — Z00.00 ANNUAL PHYSICAL EXAM: Primary | ICD-10-CM

## 2021-02-04 DIAGNOSIS — Z12.31 ENCOUNTER FOR SCREENING MAMMOGRAM FOR HIGH-RISK PATIENT: ICD-10-CM

## 2021-02-04 PROCEDURE — 3008F BODY MASS INDEX DOCD: CPT | Performed by: FAMILY MEDICINE

## 2021-02-04 PROCEDURE — 99395 PREV VISIT EST AGE 18-39: CPT | Performed by: FAMILY MEDICINE

## 2021-02-04 PROCEDURE — 3074F SYST BP LT 130 MM HG: CPT | Performed by: FAMILY MEDICINE

## 2021-02-04 PROCEDURE — 3078F DIAST BP <80 MM HG: CPT | Performed by: FAMILY MEDICINE

## 2021-02-04 NOTE — PROGRESS NOTES
HPI:   Anthony Beckford is a 45year old female who presents for a complete physical exam.     Wt Readings from Last 6 Encounters:  02/04/21 : 139 lb (63 kg)  01/31/20 : 147 lb (66.7 kg)  08/03/18 : 134 lb (60.8 kg)  07/11/18 : 132 lb (59.9 kg)  09/13/17 : 130 closely     REVIEW OF SYSTEMS:   GENERAL: feels well otherwise  SKIN: denies any unusual skin lesions  EYES:denies blurred vision or double vision  HEENT: denies nasal congestion, sinus pain or ST  LUNGS: denies shortness of breath with exertion  CARDIOVAS indicates understanding of these issues and agrees to the plan. Follow up in 1 year or sooner if needed.

## 2021-02-12 LAB
ABSOLUTE BASOPHILS: 22 CELLS/UL (ref 0–200)
ABSOLUTE EOSINOPHILS: 259 CELLS/UL (ref 15–500)
ABSOLUTE LYMPHOCYTES: 2192 CELLS/UL (ref 850–3900)
ABSOLUTE MONOCYTES: 454 CELLS/UL (ref 200–950)
ABSOLUTE NEUTROPHILS: 2473 CELLS/UL (ref 1500–7800)
ALBUMIN/GLOBULIN RATIO: 1.8 (CALC) (ref 1–2.5)
ALBUMIN: 4.3 G/DL (ref 3.6–5.1)
ALKALINE PHOSPHATASE: 68 U/L (ref 31–125)
ALT: 13 U/L (ref 6–29)
AST: 15 U/L (ref 10–30)
BASOPHILS: 0.4 %
BILIRUBIN, TOTAL: 0.9 MG/DL (ref 0.2–1.2)
BUN: 14 MG/DL (ref 7–25)
CALCIUM: 9.4 MG/DL (ref 8.6–10.2)
CARBON DIOXIDE: 26 MMOL/L (ref 20–32)
CHLORIDE: 104 MMOL/L (ref 98–110)
CHOL/HDLC RATIO: 3.1 (CALC)
CHOLESTEROL, TOTAL: 166 MG/DL
CREATININE: 0.75 MG/DL (ref 0.5–1.1)
EGFR IF AFRICN AM: 116 ML/MIN/1.73M2
EGFR IF NONAFRICN AM: 100 ML/MIN/1.73M2
EOSINOPHILS: 4.8 %
GLOBULIN: 2.4 G/DL (CALC) (ref 1.9–3.7)
GLUCOSE: 84 MG/DL (ref 65–99)
HDL CHOLESTEROL: 54 MG/DL
HEMATOCRIT: 40.2 % (ref 35–45)
HEMOGLOBIN: 13.1 G/DL (ref 11.7–15.5)
LDL-CHOLESTEROL: 97 MG/DL (CALC)
LYMPHOCYTES: 40.6 %
MCH: 28.6 PG (ref 27–33)
MCHC: 32.6 G/DL (ref 32–36)
MCV: 87.8 FL (ref 80–100)
MONOCYTES: 8.4 %
MPV: 11.2 FL (ref 7.5–12.5)
NEUTROPHILS: 45.8 %
NON-HDL CHOLESTEROL: 112 MG/DL (CALC)
PLATELET COUNT: 260 THOUSAND/UL (ref 140–400)
POTASSIUM: 4.8 MMOL/L (ref 3.5–5.3)
PROTEIN, TOTAL: 6.7 G/DL (ref 6.1–8.1)
RDW: 12.5 % (ref 11–15)
RED BLOOD CELL COUNT: 4.58 MILLION/UL (ref 3.8–5.1)
SODIUM: 137 MMOL/L (ref 135–146)
T4, FREE: 1.4 NG/DL (ref 0.8–1.8)
TRIGLYCERIDES: 65 MG/DL
TSH: 7.45 MIU/L
VITAMIN B12: 590 PG/ML (ref 200–1100)
VITAMIN D, 25-OH, TOTAL: 22 NG/ML (ref 30–100)
WHITE BLOOD CELL COUNT: 5.4 THOUSAND/UL (ref 3.8–10.8)

## 2021-04-13 RX ORDER — LEVOTHYROXINE SODIUM 0.07 MG/1
75 TABLET ORAL
Qty: 30 TABLET | Refills: 1 | Status: SHIPPED | OUTPATIENT
Start: 2021-04-13 | End: 2021-04-23

## 2021-04-21 ENCOUNTER — LAB ENCOUNTER (OUTPATIENT)
Dept: LAB | Age: 41
End: 2021-04-21
Attending: FAMILY MEDICINE
Payer: COMMERCIAL

## 2021-04-21 DIAGNOSIS — E03.8 OTHER SPECIFIED HYPOTHYROIDISM: ICD-10-CM

## 2021-04-21 DIAGNOSIS — Z00.00 ANNUAL PHYSICAL EXAM: ICD-10-CM

## 2021-04-21 PROCEDURE — 36415 COLL VENOUS BLD VENIPUNCTURE: CPT

## 2021-04-21 PROCEDURE — 80053 COMPREHEN METABOLIC PANEL: CPT

## 2021-04-21 PROCEDURE — 82306 VITAMIN D 25 HYDROXY: CPT

## 2021-04-21 PROCEDURE — 82607 VITAMIN B-12: CPT

## 2021-04-21 PROCEDURE — 84439 ASSAY OF FREE THYROXINE: CPT

## 2021-04-21 PROCEDURE — 80061 LIPID PANEL: CPT

## 2021-04-21 PROCEDURE — 84443 ASSAY THYROID STIM HORMONE: CPT

## 2021-04-21 PROCEDURE — 85025 COMPLETE CBC W/AUTO DIFF WBC: CPT

## 2021-06-14 ENCOUNTER — TELEPHONE (OUTPATIENT)
Dept: FAMILY MEDICINE CLINIC | Facility: CLINIC | Age: 41
End: 2021-06-14

## 2021-06-14 RX ORDER — LEVOTHYROXINE SODIUM 0.07 MG/1
75 TABLET ORAL
Qty: 30 TABLET | Refills: 0 | Status: SHIPPED | OUTPATIENT
Start: 2021-06-14 | End: 2021-06-24 | Stop reason: DRUGHIGH

## 2021-06-17 ENCOUNTER — LAB ENCOUNTER (OUTPATIENT)
Dept: LAB | Age: 41
End: 2021-06-17
Attending: FAMILY MEDICINE
Payer: COMMERCIAL

## 2021-06-17 DIAGNOSIS — E03.8 OTHER SPECIFIED HYPOTHYROIDISM: ICD-10-CM

## 2021-06-17 PROCEDURE — 84443 ASSAY THYROID STIM HORMONE: CPT

## 2021-06-17 PROCEDURE — 84439 ASSAY OF FREE THYROXINE: CPT

## 2021-06-17 PROCEDURE — 36415 COLL VENOUS BLD VENIPUNCTURE: CPT

## 2021-09-07 ENCOUNTER — LAB ENCOUNTER (OUTPATIENT)
Dept: LAB | Age: 41
End: 2021-09-07
Attending: FAMILY MEDICINE
Payer: COMMERCIAL

## 2021-09-07 DIAGNOSIS — E03.8 OTHER SPECIFIED HYPOTHYROIDISM: ICD-10-CM

## 2021-09-07 LAB
T4 FREE SERPL-MCNC: 1.1 NG/DL (ref 0.8–1.7)
TSI SER-ACNC: 8.21 MIU/ML (ref 0.36–3.74)

## 2021-09-07 PROCEDURE — 84439 ASSAY OF FREE THYROXINE: CPT

## 2021-09-07 PROCEDURE — 84443 ASSAY THYROID STIM HORMONE: CPT

## 2021-09-07 PROCEDURE — 36415 COLL VENOUS BLD VENIPUNCTURE: CPT

## 2021-10-12 ENCOUNTER — LAB ENCOUNTER (OUTPATIENT)
Dept: LAB | Age: 41
End: 2021-10-12
Attending: FAMILY MEDICINE
Payer: COMMERCIAL

## 2021-10-12 DIAGNOSIS — E03.8 OTHER SPECIFIED HYPOTHYROIDISM: ICD-10-CM

## 2021-10-12 PROCEDURE — 84443 ASSAY THYROID STIM HORMONE: CPT

## 2021-10-12 PROCEDURE — 84439 ASSAY OF FREE THYROXINE: CPT

## 2021-10-12 PROCEDURE — 36415 COLL VENOUS BLD VENIPUNCTURE: CPT

## 2021-12-15 RX ORDER — LEVOTHYROXINE SODIUM 0.07 MG/1
TABLET ORAL
Qty: 30 TABLET | Refills: 1 | Status: SHIPPED | OUTPATIENT
Start: 2021-12-15

## 2022-01-29 ENCOUNTER — HOSPITAL ENCOUNTER (OUTPATIENT)
Dept: MAMMOGRAPHY | Age: 42
Discharge: HOME | End: 2022-01-29
Attending: FAMILY MEDICINE
Payer: COMMERCIAL

## 2022-01-29 DIAGNOSIS — Z12.31 ENCOUNTER FOR SCREENING MAMMOGRAM FOR HIGH-RISK PATIENT: ICD-10-CM

## 2022-01-29 PROCEDURE — 77067 SCR MAMMO BI INCL CAD: CPT | Performed by: FAMILY MEDICINE

## 2022-01-29 PROCEDURE — 77063 BREAST TOMOSYNTHESIS BI: CPT | Performed by: FAMILY MEDICINE

## 2022-02-14 RX ORDER — LEVOTHYROXINE SODIUM 0.07 MG/1
TABLET ORAL
Qty: 30 TABLET | Refills: 1 | Status: SHIPPED | OUTPATIENT
Start: 2022-02-14

## 2022-02-14 NOTE — TELEPHONE ENCOUNTER
Next Appt:    With [facility] Ema Ards, DO)  03/08/2022 at 7:30 AM    LV 2-4-21    LR 12-15-21

## 2022-03-08 ENCOUNTER — OFFICE VISIT (OUTPATIENT)
Dept: FAMILY MEDICINE CLINIC | Facility: CLINIC | Age: 42
End: 2022-03-08
Payer: COMMERCIAL

## 2022-03-08 VITALS
SYSTOLIC BLOOD PRESSURE: 110 MMHG | TEMPERATURE: 98 F | OXYGEN SATURATION: 99 % | HEART RATE: 68 BPM | WEIGHT: 143 LBS | BODY MASS INDEX: 25.66 KG/M2 | RESPIRATION RATE: 18 BRPM | HEIGHT: 62.5 IN | DIASTOLIC BLOOD PRESSURE: 68 MMHG

## 2022-03-08 DIAGNOSIS — D64.9 LOW HEMOGLOBIN: ICD-10-CM

## 2022-03-08 DIAGNOSIS — Z00.00 ANNUAL PHYSICAL EXAM: Primary | ICD-10-CM

## 2022-03-08 DIAGNOSIS — D64.9 ANEMIA, UNSPECIFIED TYPE: ICD-10-CM

## 2022-03-08 DIAGNOSIS — E56.9 VITAMIN DEFICIENCY: ICD-10-CM

## 2022-03-08 DIAGNOSIS — K59.09 CHRONIC CONSTIPATION: ICD-10-CM

## 2022-03-08 PROCEDURE — 3008F BODY MASS INDEX DOCD: CPT | Performed by: FAMILY MEDICINE

## 2022-03-08 PROCEDURE — 99396 PREV VISIT EST AGE 40-64: CPT | Performed by: FAMILY MEDICINE

## 2022-03-08 PROCEDURE — 3074F SYST BP LT 130 MM HG: CPT | Performed by: FAMILY MEDICINE

## 2022-03-08 PROCEDURE — 3078F DIAST BP <80 MM HG: CPT | Performed by: FAMILY MEDICINE

## 2022-03-14 ENCOUNTER — LAB ENCOUNTER (OUTPATIENT)
Dept: LAB | Age: 42
End: 2022-03-14
Attending: FAMILY MEDICINE
Payer: COMMERCIAL

## 2022-03-14 DIAGNOSIS — E03.8 OTHER SPECIFIED HYPOTHYROIDISM: ICD-10-CM

## 2022-03-14 DIAGNOSIS — Z00.00 ROUTINE GENERAL MEDICAL EXAMINATION AT A HEALTH CARE FACILITY: Primary | ICD-10-CM

## 2022-03-14 LAB
ALBUMIN SERPL-MCNC: 3.7 G/DL (ref 3.4–5)
ALBUMIN/GLOB SERPL: 1.3 {RATIO} (ref 1–2)
ALP LIVER SERPL-CCNC: 64 U/L
ALT SERPL-CCNC: 36 U/L
ANION GAP SERPL CALC-SCNC: 3 MMOL/L (ref 0–18)
AST SERPL-CCNC: 24 U/L (ref 15–37)
BASOPHILS # BLD AUTO: 0.02 X10(3) UL (ref 0–0.2)
BASOPHILS NFR BLD AUTO: 0.4 %
BILIRUB SERPL-MCNC: 0.5 MG/DL (ref 0.1–2)
BUN BLD-MCNC: 11 MG/DL (ref 7–18)
CALCIUM BLD-MCNC: 8.7 MG/DL (ref 8.5–10.1)
CHLORIDE SERPL-SCNC: 107 MMOL/L (ref 98–112)
CHOLEST SERPL-MCNC: 161 MG/DL (ref ?–200)
CO2 SERPL-SCNC: 29 MMOL/L (ref 21–32)
EOSINOPHIL # BLD AUTO: 0.21 X10(3) UL (ref 0–0.7)
EOSINOPHIL NFR BLD AUTO: 4.3 %
ERYTHROCYTE [DISTWIDTH] IN BLOOD BY AUTOMATED COUNT: 14.8 %
FASTING PATIENT LIPID ANSWER: YES
FASTING STATUS PATIENT QL REPORTED: YES
GLOBULIN PLAS-MCNC: 2.9 G/DL (ref 2.8–4.4)
GLUCOSE BLD-MCNC: 87 MG/DL (ref 70–99)
HCT VFR BLD AUTO: 33.9 %
HDLC SERPL-MCNC: 52 MG/DL (ref 40–59)
HGB BLD-MCNC: 10.8 G/DL
IMM GRANULOCYTES # BLD AUTO: 0.01 X10(3) UL (ref 0–1)
IMM GRANULOCYTES NFR BLD: 0.2 %
LDLC SERPL CALC-MCNC: 91 MG/DL (ref ?–100)
LYMPHOCYTES # BLD AUTO: 1.55 X10(3) UL (ref 1–4)
LYMPHOCYTES NFR BLD AUTO: 31.7 %
MCH RBC QN AUTO: 27.3 PG (ref 26–34)
MCHC RBC AUTO-ENTMCNC: 31.9 G/DL (ref 31–37)
MCV RBC AUTO: 85.8 FL
MONOCYTES # BLD AUTO: 0.44 X10(3) UL (ref 0.1–1)
MONOCYTES NFR BLD AUTO: 9 %
NEUTROPHILS # BLD AUTO: 2.66 X10 (3) UL (ref 1.5–7.7)
NEUTROPHILS # BLD AUTO: 2.66 X10(3) UL (ref 1.5–7.7)
NONHDLC SERPL-MCNC: 109 MG/DL (ref ?–130)
PLATELET # BLD AUTO: 249 10(3)UL (ref 150–450)
POTASSIUM SERPL-SCNC: 4.8 MMOL/L (ref 3.5–5.1)
PROT SERPL-MCNC: 6.6 G/DL (ref 6.4–8.2)
RBC # BLD AUTO: 3.95 X10(6)UL
SODIUM SERPL-SCNC: 139 MMOL/L (ref 136–145)
T4 FREE SERPL-MCNC: 1.2 NG/DL (ref 0.8–1.7)
TRIGL SERPL-MCNC: 97 MG/DL (ref 30–149)
TSI SER-ACNC: 6.77 MIU/ML (ref 0.36–3.74)
VIT B12 SERPL-MCNC: 522 PG/ML (ref 193–986)
VIT D+METAB SERPL-MCNC: 33.7 NG/ML (ref 30–100)
VLDLC SERPL CALC-MCNC: 16 MG/DL (ref 0–30)
WBC # BLD AUTO: 4.9 X10(3) UL (ref 4–11)

## 2022-03-14 PROCEDURE — 85025 COMPLETE CBC W/AUTO DIFF WBC: CPT | Performed by: FAMILY MEDICINE

## 2022-03-14 PROCEDURE — 36415 COLL VENOUS BLD VENIPUNCTURE: CPT | Performed by: FAMILY MEDICINE

## 2022-03-14 PROCEDURE — 84439 ASSAY OF FREE THYROXINE: CPT

## 2022-03-14 PROCEDURE — 84443 ASSAY THYROID STIM HORMONE: CPT

## 2022-03-14 PROCEDURE — 80053 COMPREHEN METABOLIC PANEL: CPT | Performed by: FAMILY MEDICINE

## 2022-03-14 PROCEDURE — 82607 VITAMIN B-12: CPT | Performed by: FAMILY MEDICINE

## 2022-03-14 PROCEDURE — 82728 ASSAY OF FERRITIN: CPT | Performed by: FAMILY MEDICINE

## 2022-03-14 PROCEDURE — 82306 VITAMIN D 25 HYDROXY: CPT | Performed by: FAMILY MEDICINE

## 2022-03-14 PROCEDURE — 80061 LIPID PANEL: CPT

## 2022-03-15 LAB — DEPRECATED HBV CORE AB SER IA-ACNC: 4.7 NG/ML

## 2022-03-18 ENCOUNTER — TELEPHONE (OUTPATIENT)
Dept: FAMILY MEDICINE CLINIC | Facility: CLINIC | Age: 42
End: 2022-03-18

## 2022-03-18 ENCOUNTER — HOSPITAL ENCOUNTER (OUTPATIENT)
Dept: CT IMAGING | Age: 42
Discharge: HOME | End: 2022-03-18
Attending: FAMILY MEDICINE

## 2022-03-18 DIAGNOSIS — Z13.6 SCREENING FOR HEART DISEASE: ICD-10-CM

## 2022-03-19 ENCOUNTER — PATIENT MESSAGE (OUTPATIENT)
Dept: FAMILY MEDICINE CLINIC | Facility: CLINIC | Age: 42
End: 2022-03-19

## 2022-05-02 RX ORDER — LEVOTHYROXINE SODIUM 0.1 MG/1
100 TABLET ORAL
Qty: 90 TABLET | Refills: 0 | Status: SHIPPED | OUTPATIENT
Start: 2022-05-02

## 2022-06-21 ENCOUNTER — HOSPITAL ENCOUNTER (OUTPATIENT)
Dept: ULTRASOUND IMAGING | Age: 42
Discharge: HOME | End: 2022-06-21
Attending: FAMILY MEDICINE
Payer: COMMERCIAL

## 2022-06-21 DIAGNOSIS — R92.2 DENSE BREAST TISSUE ON MAMMOGRAM: ICD-10-CM

## 2022-06-21 PROCEDURE — 76641 ULTRASOUND BREAST COMPLETE: CPT | Performed by: FAMILY MEDICINE

## 2022-06-22 DIAGNOSIS — Z92.89 HISTORY OF MAMMOGRAPHY, SCREENING: Primary | ICD-10-CM

## 2022-06-23 ENCOUNTER — APPOINTMENT (OUTPATIENT)
Dept: ULTRASOUND IMAGING | Age: 42
End: 2022-06-23
Attending: FAMILY MEDICINE
Payer: COMMERCIAL

## 2022-06-23 ENCOUNTER — HOSPITAL ENCOUNTER (OUTPATIENT)
Dept: ULTRASOUND IMAGING | Age: 42
Discharge: HOME | End: 2022-06-23
Attending: FAMILY MEDICINE
Payer: COMMERCIAL

## 2022-06-23 DIAGNOSIS — E03.9 HYPOTHYROIDISM, UNSPECIFIED TYPE: ICD-10-CM

## 2022-06-23 PROCEDURE — 76536 US EXAM OF HEAD AND NECK: CPT | Performed by: FAMILY MEDICINE

## 2022-07-05 RX ORDER — LEVOTHYROXINE SODIUM 0.1 MG/1
TABLET ORAL
Qty: 90 TABLET | Refills: 0 | Status: SHIPPED | OUTPATIENT
Start: 2022-07-05

## 2022-07-07 ENCOUNTER — HOSPITAL ENCOUNTER (OUTPATIENT)
Dept: ULTRASOUND IMAGING | Age: 42
Discharge: HOME | End: 2022-07-07
Attending: FAMILY MEDICINE
Payer: COMMERCIAL

## 2022-07-07 DIAGNOSIS — D64.9 ANEMIA, UNSPECIFIED TYPE: ICD-10-CM

## 2022-07-07 DIAGNOSIS — D64.9 LOW HEMOGLOBIN: ICD-10-CM

## 2022-07-07 PROCEDURE — 76830 TRANSVAGINAL US NON-OB: CPT | Performed by: FAMILY MEDICINE

## 2022-07-07 PROCEDURE — 76856 US EXAM PELVIC COMPLETE: CPT | Performed by: FAMILY MEDICINE

## 2022-09-18 ENCOUNTER — OFFICE VISIT (OUTPATIENT)
Dept: FAMILY MEDICINE CLINIC | Facility: CLINIC | Age: 42
End: 2022-09-18
Payer: COMMERCIAL

## 2022-09-18 VITALS
OXYGEN SATURATION: 98 % | SYSTOLIC BLOOD PRESSURE: 102 MMHG | TEMPERATURE: 98 F | HEART RATE: 67 BPM | BODY MASS INDEX: 24.98 KG/M2 | WEIGHT: 141 LBS | HEIGHT: 63 IN | RESPIRATION RATE: 18 BRPM | DIASTOLIC BLOOD PRESSURE: 68 MMHG

## 2022-09-18 DIAGNOSIS — H92.01 OTALGIA, RIGHT: Primary | ICD-10-CM

## 2022-09-18 DIAGNOSIS — H65.91 MIDDLE EAR EFFUSION, RIGHT: ICD-10-CM

## 2022-09-18 PROCEDURE — 3074F SYST BP LT 130 MM HG: CPT | Performed by: PHYSICIAN ASSISTANT

## 2022-09-18 PROCEDURE — 99213 OFFICE O/P EST LOW 20 MIN: CPT | Performed by: PHYSICIAN ASSISTANT

## 2022-09-18 PROCEDURE — 3078F DIAST BP <80 MM HG: CPT | Performed by: PHYSICIAN ASSISTANT

## 2022-09-18 PROCEDURE — 3008F BODY MASS INDEX DOCD: CPT | Performed by: PHYSICIAN ASSISTANT

## 2022-11-15 RX ORDER — LEVOTHYROXINE SODIUM 0.07 MG/1
TABLET ORAL
Qty: 30 TABLET | Refills: 1 | Status: SHIPPED | OUTPATIENT
Start: 2022-11-15

## 2023-01-24 ENCOUNTER — OFFICE VISIT (OUTPATIENT)
Dept: FAMILY MEDICINE CLINIC | Facility: CLINIC | Age: 43
End: 2023-01-24
Payer: COMMERCIAL

## 2023-01-24 VITALS
HEIGHT: 63 IN | HEART RATE: 76 BPM | DIASTOLIC BLOOD PRESSURE: 82 MMHG | BODY MASS INDEX: 24.8 KG/M2 | OXYGEN SATURATION: 98 % | RESPIRATION RATE: 16 BRPM | WEIGHT: 140 LBS | SYSTOLIC BLOOD PRESSURE: 116 MMHG

## 2023-01-24 DIAGNOSIS — E03.9 HYPOTHYROIDISM, UNSPECIFIED TYPE: ICD-10-CM

## 2023-01-24 DIAGNOSIS — R92.2 DENSE BREAST: ICD-10-CM

## 2023-01-24 DIAGNOSIS — K59.09 CHRONIC CONSTIPATION: ICD-10-CM

## 2023-01-24 DIAGNOSIS — Z00.00 ANNUAL PHYSICAL EXAM: Primary | ICD-10-CM

## 2023-01-24 PROCEDURE — 3074F SYST BP LT 130 MM HG: CPT | Performed by: FAMILY MEDICINE

## 2023-01-24 PROCEDURE — 99396 PREV VISIT EST AGE 40-64: CPT | Performed by: FAMILY MEDICINE

## 2023-01-24 PROCEDURE — 3008F BODY MASS INDEX DOCD: CPT | Performed by: FAMILY MEDICINE

## 2023-01-24 PROCEDURE — 3079F DIAST BP 80-89 MM HG: CPT | Performed by: FAMILY MEDICINE

## 2023-01-24 RX ORDER — LEVOTHYROXINE SODIUM 0.07 MG/1
75 TABLET ORAL
Qty: 90 TABLET | Refills: 2 | Status: SHIPPED | OUTPATIENT
Start: 2023-01-24

## 2023-01-30 ENCOUNTER — HOSPITAL ENCOUNTER (OUTPATIENT)
Dept: MAMMOGRAPHY | Age: 43
Discharge: HOME | End: 2023-01-30
Attending: FAMILY MEDICINE
Payer: COMMERCIAL

## 2023-01-30 DIAGNOSIS — Z92.89 HISTORY OF MAMMOGRAPHY, SCREENING: ICD-10-CM

## 2023-01-30 PROCEDURE — 77067 SCR MAMMO BI INCL CAD: CPT | Performed by: FAMILY MEDICINE

## 2023-01-30 PROCEDURE — 77063 BREAST TOMOSYNTHESIS BI: CPT | Performed by: FAMILY MEDICINE

## 2023-01-31 DIAGNOSIS — R92.2 DENSE BREAST: Primary | ICD-10-CM

## 2023-02-07 ENCOUNTER — HOSPITAL ENCOUNTER (OUTPATIENT)
Dept: MAMMOGRAPHY | Age: 43
Discharge: HOME | End: 2023-02-07
Attending: FAMILY MEDICINE
Payer: COMMERCIAL

## 2023-02-07 ENCOUNTER — HOSPITAL ENCOUNTER (OUTPATIENT)
Dept: ULTRASOUND IMAGING | Age: 43
Discharge: HOME | End: 2023-02-07
Attending: FAMILY MEDICINE
Payer: COMMERCIAL

## 2023-02-07 DIAGNOSIS — R92.2 DENSE BREAST: ICD-10-CM

## 2023-02-07 DIAGNOSIS — R92.2 INCONCLUSIVE MAMMOGRAM: ICD-10-CM

## 2023-02-07 PROCEDURE — 76641 ULTRASOUND BREAST COMPLETE: CPT | Performed by: FAMILY MEDICINE

## 2023-02-07 PROCEDURE — 77065 DX MAMMO INCL CAD UNI: CPT | Performed by: FAMILY MEDICINE

## 2023-02-07 PROCEDURE — 77061 BREAST TOMOSYNTHESIS UNI: CPT | Performed by: FAMILY MEDICINE

## 2023-02-08 ENCOUNTER — PATIENT MESSAGE (OUTPATIENT)
Dept: FAMILY MEDICINE CLINIC | Facility: CLINIC | Age: 43
End: 2023-02-08

## 2023-02-09 NOTE — TELEPHONE ENCOUNTER
From: Shekhar Samples  To: Darrius Hernandez DO  Sent: 2/8/2023 8:50 PM CST  Subject: Question regarding US BREAST BILATERAL COMPLETE (CPT=    Hi Dr. Sultana Solis  Not sure if you looked at the ultrasound report yet. But a little confused. Do I need to worry about the small cysts they mentioned? I am not sure. Can you let me know.   Thanks  [facility]

## 2023-03-21 DIAGNOSIS — E03.9 HYPOTHYROIDISM, UNSPECIFIED TYPE: ICD-10-CM

## 2023-03-21 RX ORDER — LEVOTHYROXINE SODIUM 0.07 MG/1
75 TABLET ORAL
Qty: 90 TABLET | Refills: 2 | Status: SHIPPED | OUTPATIENT
Start: 2023-03-21

## 2023-12-05 ENCOUNTER — APPOINTMENT (OUTPATIENT)
Dept: GENERAL RADIOLOGY | Age: 43
End: 2023-12-05
Attending: NURSE PRACTITIONER
Payer: COMMERCIAL

## 2023-12-05 ENCOUNTER — HOSPITAL ENCOUNTER (OUTPATIENT)
Age: 43
Discharge: HOME | End: 2023-12-05
Payer: COMMERCIAL

## 2023-12-05 VITALS
HEART RATE: 80 BPM | HEIGHT: 63 IN | OXYGEN SATURATION: 99 % | BODY MASS INDEX: 24.98 KG/M2 | DIASTOLIC BLOOD PRESSURE: 80 MMHG | RESPIRATION RATE: 20 BRPM | SYSTOLIC BLOOD PRESSURE: 131 MMHG | WEIGHT: 141 LBS | TEMPERATURE: 98 F

## 2023-12-05 DIAGNOSIS — M54.9 UPPER BACK PAIN: Primary | ICD-10-CM

## 2023-12-05 LAB
#MXD IC: 0.8 X10ˆ3/UL (ref 0.1–1)
BUN BLD-MCNC: 6 MG/DL (ref 7–18)
CHLORIDE BLD-SCNC: 104 MMOL/L (ref 98–112)
CO2 BLD-SCNC: 25 MMOL/L (ref 21–32)
CREAT BLD-MCNC: 0.7 MG/DL
DDIMER WHOLE BLOOD: <200 NG/ML DDU (ref ?–400)
EGFRCR SERPLBLD CKD-EPI 2021: 111 ML/MIN/1.73M2 (ref 60–?)
GLUCOSE BLD-MCNC: 96 MG/DL (ref 70–99)
HCT VFR BLD AUTO: 39.7 %
HCT VFR BLD CALC: 42 %
HGB BLD-MCNC: 13 G/DL
ISTAT IONIZED CALCIUM FOR CHEM 8: 1.22 MMOL/L (ref 1.12–1.32)
LYMPHOCYTES # BLD AUTO: 2.2 X10ˆ3/UL (ref 1–4)
LYMPHOCYTES NFR BLD AUTO: 30.8 %
MCH RBC QN AUTO: 28.2 PG (ref 26–34)
MCHC RBC AUTO-ENTMCNC: 32.7 G/DL (ref 31–37)
MCV RBC AUTO: 86.1 FL (ref 80–100)
MIXED CELL %: 11 %
NEUTROPHILS # BLD AUTO: 4.3 X10ˆ3/UL (ref 1.5–7.7)
NEUTROPHILS NFR BLD AUTO: 58.2 %
PLATELET # BLD AUTO: 304 X10ˆ3/UL (ref 150–450)
POTASSIUM BLD-SCNC: 3.7 MMOL/L (ref 3.6–5.1)
RBC # BLD AUTO: 4.61 X10ˆ6/UL
SODIUM BLD-SCNC: 141 MMOL/L (ref 136–145)
TROPONIN I BLD-MCNC: <0.02 NG/ML
WBC # BLD AUTO: 7.3 X10ˆ3/UL (ref 4–11)

## 2023-12-05 PROCEDURE — 85378 FIBRIN DEGRADE SEMIQUANT: CPT | Performed by: NURSE PRACTITIONER

## 2023-12-05 PROCEDURE — 71046 X-RAY EXAM CHEST 2 VIEWS: CPT | Performed by: NURSE PRACTITIONER

## 2023-12-05 PROCEDURE — 99204 OFFICE O/P NEW MOD 45 MIN: CPT | Performed by: NURSE PRACTITIONER

## 2023-12-05 PROCEDURE — 84484 ASSAY OF TROPONIN QUANT: CPT | Performed by: NURSE PRACTITIONER

## 2023-12-05 PROCEDURE — 85025 COMPLETE CBC W/AUTO DIFF WBC: CPT | Performed by: NURSE PRACTITIONER

## 2023-12-05 PROCEDURE — 93000 ELECTROCARDIOGRAM COMPLETE: CPT | Performed by: NURSE PRACTITIONER

## 2023-12-05 PROCEDURE — 80047 BASIC METABLC PNL IONIZED CA: CPT | Performed by: NURSE PRACTITIONER

## 2023-12-05 RX ORDER — CYCLOBENZAPRINE HCL 10 MG
10 TABLET ORAL 3 TIMES DAILY PRN
Qty: 20 TABLET | Refills: 0 | Status: SHIPPED | OUTPATIENT
Start: 2023-12-05 | End: 2023-12-12

## 2023-12-06 LAB
ATRIAL RATE: 78 BPM
P AXIS: 68 DEGREES
P-R INTERVAL: 168 MS
Q-T INTERVAL: 368 MS
QRS DURATION: 70 MS
QTC CALCULATION (BEZET): 419 MS
R AXIS: 5 DEGREES
T AXIS: 60 DEGREES
VENTRICULAR RATE: 78 BPM

## 2023-12-06 NOTE — ED INITIAL ASSESSMENT (HPI)
Pt c/o cough and back pain. Starting 11/30/2023. Pt c/o mid back pain. Pt denies numbness and tingling.

## 2023-12-06 NOTE — DISCHARGE INSTRUCTIONS
Tylenol and Motrin as needed for pain. You may take the Flexeril as needed however do not drive for 6 to 8 hours after taking. You may also apply IcyHot or lidocaine patches.

## 2023-12-08 PROBLEM — R19.4 CHANGE IN BOWEL HABITS: Status: ACTIVE | Noted: 2023-12-08

## 2024-01-08 ENCOUNTER — PATIENT MESSAGE (OUTPATIENT)
Dept: FAMILY MEDICINE CLINIC | Facility: CLINIC | Age: 44
End: 2024-01-08

## 2024-01-08 DIAGNOSIS — D64.9 LOW HEMOGLOBIN: ICD-10-CM

## 2024-01-08 DIAGNOSIS — E03.9 HYPOTHYROIDISM, UNSPECIFIED TYPE: ICD-10-CM

## 2024-01-08 DIAGNOSIS — Z82.49 FAMILY HISTORY OF EARLY CAD: ICD-10-CM

## 2024-01-08 DIAGNOSIS — Z00.00 ROUTINE GENERAL MEDICAL EXAMINATION AT HEALTH CARE FACILITY: ICD-10-CM

## 2024-01-08 DIAGNOSIS — E56.9 VITAMIN DEFICIENCY: ICD-10-CM

## 2024-01-08 DIAGNOSIS — Z92.89 HISTORY OF MAMMOGRAPHY, SCREENING: Primary | ICD-10-CM

## 2024-01-08 NOTE — TELEPHONE ENCOUNTER
From: Audra Brown  To: Harmony Avina  Sent: 1/8/2024 10:08 AM CST  Subject: Upcoming Annual Physical     Hi Dr. Avina  I have my physical scheduled in Feb. I also got a reminder to schedule my mammogram.   Can you place the orders for the annual blood work and mammogram as well.   Annual blood work orders for [facility].     Thanks  Audra

## 2024-01-08 NOTE — TELEPHONE ENCOUNTER
Okay for screening mammo? Last had diag mammo L on 2/27/23    Routine labs? Do you want ferritin level also?

## 2024-02-25 LAB
ABSOLUTE BASOPHILS: 21 CELLS/UL (ref 0–200)
ABSOLUTE EOSINOPHILS: 213 CELLS/UL (ref 15–500)
ABSOLUTE LYMPHOCYTES: 1773 CELLS/UL (ref 850–3900)
ABSOLUTE MONOCYTES: 463 CELLS/UL (ref 200–950)
ABSOLUTE NEUTROPHILS: 2730 CELLS/UL (ref 1500–7800)
ALBUMIN/GLOBULIN RATIO: 1.8 (CALC) (ref 1–2.5)
ALBUMIN: 4.5 G/DL (ref 3.6–5.1)
ALKALINE PHOSPHATASE: 57 U/L (ref 31–125)
ALT: 9 U/L (ref 6–29)
AST: 13 U/L (ref 10–30)
BASOPHILS: 0.4 %
BILIRUBIN, TOTAL: 0.6 MG/DL (ref 0.2–1.2)
BUN: 10 MG/DL (ref 7–25)
CALCIUM: 9.6 MG/DL (ref 8.6–10.2)
CARBON DIOXIDE: 27 MMOL/L (ref 20–32)
CHLORIDE: 102 MMOL/L (ref 98–110)
CHOL/HDLC RATIO: 3.9 (CALC)
CHOLESTEROL, TOTAL: 205 MG/DL
CREATININE: 0.71 MG/DL (ref 0.5–0.99)
EGFR: 109 ML/MIN/1.73M2
EOSINOPHILS: 4.1 %
FERRITIN: 7 NG/ML (ref 16–232)
GLOBULIN: 2.5 G/DL (CALC) (ref 1.9–3.7)
GLUCOSE: 85 MG/DL (ref 65–99)
HDL CHOLESTEROL: 53 MG/DL
HEMATOCRIT: 39.4 % (ref 35–45)
HEMOGLOBIN: 12.9 G/DL (ref 11.7–15.5)
LDL-CHOLESTEROL: 127 MG/DL (CALC)
LYMPHOCYTES: 34.1 %
MCH: 28.4 PG (ref 27–33)
MCHC: 32.7 G/DL (ref 32–36)
MCV: 86.8 FL (ref 80–100)
MONOCYTES: 8.9 %
MPV: 11.5 FL (ref 7.5–12.5)
NEUTROPHILS: 52.5 %
NON-HDL CHOLESTEROL: 152 MG/DL (CALC)
PLATELET COUNT: 274 THOUSAND/UL (ref 140–400)
POTASSIUM: 4.2 MMOL/L (ref 3.5–5.3)
PROTEIN, TOTAL: 7 G/DL (ref 6.1–8.1)
RDW: 13 % (ref 11–15)
RED BLOOD CELL COUNT: 4.54 MILLION/UL (ref 3.8–5.1)
SODIUM: 137 MMOL/L (ref 135–146)
T4, FREE: 1.2 NG/DL (ref 0.8–1.8)
TRIGLYCERIDES: 140 MG/DL
TSH: 9.3 MIU/L
VITAMIN B12: 628 PG/ML (ref 200–1100)
VITAMIN D, 25-OH, TOTAL: 26 NG/ML (ref 30–100)
WHITE BLOOD CELL COUNT: 5.2 THOUSAND/UL (ref 3.8–10.8)

## 2024-02-26 ENCOUNTER — OFFICE VISIT (OUTPATIENT)
Dept: FAMILY MEDICINE CLINIC | Facility: CLINIC | Age: 44
End: 2024-02-26
Payer: COMMERCIAL

## 2024-02-26 VITALS
WEIGHT: 142 LBS | SYSTOLIC BLOOD PRESSURE: 102 MMHG | HEIGHT: 63 IN | DIASTOLIC BLOOD PRESSURE: 62 MMHG | RESPIRATION RATE: 16 BRPM | BODY MASS INDEX: 25.16 KG/M2

## 2024-02-26 DIAGNOSIS — E61.1 IRON DEFICIENCY: ICD-10-CM

## 2024-02-26 DIAGNOSIS — E03.9 HYPOTHYROIDISM, UNSPECIFIED TYPE: ICD-10-CM

## 2024-02-26 DIAGNOSIS — Z00.00 ANNUAL PHYSICAL EXAM: Primary | ICD-10-CM

## 2024-02-26 PROCEDURE — 99396 PREV VISIT EST AGE 40-64: CPT | Performed by: FAMILY MEDICINE

## 2024-02-26 RX ORDER — LEVOTHYROXINE SODIUM 88 UG/1
88 TABLET ORAL
Qty: 90 TABLET | Refills: 0 | Status: SHIPPED | OUTPATIENT
Start: 2024-02-26

## 2024-03-09 ENCOUNTER — HOSPITAL ENCOUNTER (OUTPATIENT)
Dept: MAMMOGRAPHY | Age: 44
Discharge: HOME | End: 2024-03-09
Attending: FAMILY MEDICINE
Payer: COMMERCIAL

## 2024-03-09 DIAGNOSIS — Z92.89 HISTORY OF MAMMOGRAPHY, SCREENING: ICD-10-CM

## 2024-03-09 PROCEDURE — 77067 SCR MAMMO BI INCL CAD: CPT | Performed by: FAMILY MEDICINE

## 2024-03-09 PROCEDURE — 77063 BREAST TOMOSYNTHESIS BI: CPT | Performed by: FAMILY MEDICINE

## 2024-03-25 ENCOUNTER — HOSPITAL ENCOUNTER (OUTPATIENT)
Dept: ULTRASOUND IMAGING | Age: 44
Discharge: HOME | End: 2024-03-25
Attending: FAMILY MEDICINE
Payer: COMMERCIAL

## 2024-03-25 ENCOUNTER — HOSPITAL ENCOUNTER (OUTPATIENT)
Dept: MAMMOGRAPHY | Age: 44
Discharge: HOME | End: 2024-03-25
Attending: FAMILY MEDICINE
Payer: COMMERCIAL

## 2024-03-25 DIAGNOSIS — E03.9 HYPOTHYROIDISM, UNSPECIFIED TYPE: ICD-10-CM

## 2024-03-25 DIAGNOSIS — R92.2 INCONCLUSIVE MAMMOGRAM: ICD-10-CM

## 2024-03-25 PROCEDURE — 77065 DX MAMMO INCL CAD UNI: CPT | Performed by: FAMILY MEDICINE

## 2024-03-25 PROCEDURE — 77061 BREAST TOMOSYNTHESIS UNI: CPT | Performed by: FAMILY MEDICINE

## 2024-03-25 PROCEDURE — 76642 ULTRASOUND BREAST LIMITED: CPT | Performed by: FAMILY MEDICINE

## 2024-03-25 RX ORDER — LEVOTHYROXINE SODIUM 88 UG/1
88 TABLET ORAL
Qty: 30 TABLET | Refills: 0 | Status: SHIPPED | OUTPATIENT
Start: 2024-03-25

## 2024-03-25 RX ORDER — LEVOTHYROXINE SODIUM 88 UG/1
88 TABLET ORAL
Qty: 90 TABLET | Refills: 0 | Status: CANCELLED | OUTPATIENT
Start: 2024-03-25

## 2024-03-25 NOTE — TELEPHONE ENCOUNTER
.A refill request was received for:  Requested Prescriptions     Pending Prescriptions Disp Refills    LEVOTHYROXINE 88 MCG Oral Tab [Pharmacy Med Name: LEVOTHYROXINE 0.088MG (88MCG) TAB] 90 tablet 0     Sig: TAKE 1 TABLET(88 MCG) BY MOUTH BEFORE BREAKFAST       Last refill date:   2/26/2024    Last office visit: 2/26/2024    Follow up due:  Future Appointments   Date Time Provider Department Center   3/25/2024  2:20 PM PF [facility] PF [facility] [place]

## 2024-03-26 RX ORDER — ERGOCALCIFEROL 1.25 MG/1
50000 CAPSULE ORAL WEEKLY
Qty: 20 CAPSULE | Refills: 0 | OUTPATIENT
Start: 2024-03-26

## 2024-04-24 DIAGNOSIS — E03.9 HYPOTHYROIDISM, UNSPECIFIED TYPE: ICD-10-CM

## 2024-04-24 RX ORDER — LEVOTHYROXINE SODIUM 88 UG/1
88 TABLET ORAL
Qty: 30 TABLET | Refills: 0 | Status: SHIPPED | OUTPATIENT
Start: 2024-04-24

## 2024-04-24 NOTE — TELEPHONE ENCOUNTER
A refill request was received for:  Requested Prescriptions     Pending Prescriptions Disp Refills    LEVOTHYROXINE 88 MCG Oral Tab [Pharmacy Med Name: LEVOTHYROXINE 0.088MG (88MCG) TAB] 30 tablet 0     Sig: TAKE 1 TABLET(88 MCG) BY MOUTH BEFORE BREAKFAST     Component  Ref Range & Units 2/24/24 12:27 PM   TSH  mIU/L 9.30     Component  Ref Range & Units 2/24/24 12:27 PM   T4, FREE  0.8 - 1.8 ng/dL 1.2       Last refill date:3/25/2024       Last office visit:2/26/2024     Follow up due:  No future appointments.

## 2024-05-05 ENCOUNTER — OFFICE VISIT (OUTPATIENT)
Dept: FAMILY MEDICINE CLINIC | Facility: CLINIC | Age: 44
End: 2024-05-05
Payer: COMMERCIAL

## 2024-05-05 VITALS
DIASTOLIC BLOOD PRESSURE: 59 MMHG | BODY MASS INDEX: 24.8 KG/M2 | TEMPERATURE: 97 F | SYSTOLIC BLOOD PRESSURE: 111 MMHG | OXYGEN SATURATION: 99 % | HEIGHT: 63 IN | RESPIRATION RATE: 16 BRPM | HEART RATE: 75 BPM | WEIGHT: 140 LBS

## 2024-05-05 DIAGNOSIS — R22.0 LIP SWELLING: Primary | ICD-10-CM

## 2024-05-05 PROCEDURE — 99213 OFFICE O/P EST LOW 20 MIN: CPT | Performed by: NURSE PRACTITIONER

## 2024-05-05 RX ORDER — PREDNISONE 20 MG/1
40 TABLET ORAL DAILY
Qty: 6 TABLET | Refills: 0 | Status: SHIPPED | OUTPATIENT
Start: 2024-05-05 | End: 2024-05-08

## 2024-05-05 NOTE — PROGRESS NOTES
CHIEF COMPLAINT:     Chief Complaint   Patient presents with    Allergic Rxn Allergies     Had facial done 1.5 hrs ago, L side corner of bottom lip swollen, discomfort          HPI:    Audra Brown is a 43 year old female who presents for evaluation of a lip swelling. Reports just got a facial, symptoms happened right after facial. Symptoms for 2 hours. Reports left lower lip swelling. Denies sob, wheezing, chest pain, sore throat. No new or changes in current medications. Symptoms same since started. Patient denies similar issue in the past. The affected locations include left lower lip. Patient has treated swelling with none.  Associated symptoms include: no pruritis, mild tenderness.  Exposure: + exposure to facial.     Pertinent negatives include no rash drainage, anorexia, congestion, cough, diarrhea, eye pain, facial edema, fatigue, fever, joint pain, rhinorrhea, shortness of breath, sore throat or vomiting.      Current Outpatient Medications   Medication Sig Dispense Refill    predniSONE 20 MG Oral Tab Take 2 tablets (40 mg total) by mouth daily for 3 days. Take with food 6 tablet 0    levothyroxine 88 MCG Oral Tab Take 1 tablet (88 mcg total) by mouth before breakfast. 30 tablet 0    ergocalciferol 1.25 MG (71652 UT) Oral Cap Take 1 capsule (50,000 Units total) by mouth once a week. Once weekly x 20 weeks.  Take with food 20 capsule 0    Ferric Maltol (ACCRUFER) 30 MG Oral Cap Take 1 capsule by mouth daily. (Patient not taking: Reported on 2/26/2024)      levothyroxine 75 MCG Oral Tab Take 1 tablet (75 mcg total) by mouth before breakfast. 90 tablet 2    Cholecalciferol (VITAMIN D3) 17197 UNITS Oral Cap Take 1 tablet by mouth. 2000 units       Vitamin B-12 (VITAMIN B12) 500 MCG Oral Tab Take 1 tablet (500 mcg total) by mouth daily.        Past Medical History:    Back pain    Not sure    Bloating    It depends on what i eat and befor my periods    Constipation    Fatigue    Flatulence/gas pain/belching    It  depends on what i eat    Frequent use of laxatives    Senna leaves powder    Heartburn    Occasional; depends on eating    Thyroid disease      Past Surgical History:   Procedure Laterality Date      2008/ 2012    x2      Family History   Problem Relation Age of Onset    Heart Attack Father     Hypertension Father     Heart Disorder Father         2 heart attacks    Heart Disorder Mother         2 minor attacks    Hypertension Mother     Prostate Cancer Maternal Grandfather     Hypertension Paternal Grandmother     Hypertension Paternal Grandfather     Heart Disorder Paternal Grandfather         CAD      Social History     Socioeconomic History    Marital status:    Tobacco Use    Smoking status: Never    Smokeless tobacco: Never   Substance and Sexual Activity    Alcohol use: Yes    Drug use: No   Other Topics Concern    Caffeine Concern Yes    Exercise Yes     Social Determinants of Health      [facility] from [facility]    Social Connections    Received from [facility], [facility]    Housing Stability         REVIEW OF SYSTEMS:   GENERAL: feels well otherwise, no fever, no chills.  SKIN: Per HPI. No edema. No ulcerations.  EYES: Denies blurred vision or double vision  HEENT: Denies rhinorrhea, edema of the lips or swelling of throat.  CARDIOVASCULAR: Denies chest pains or palpitations.  LUNGS: Denies shortness of breath with exertion or rest. No cough or wheezing.  LYMPH: Denies enlargement of the lymph nodes.  MUSC/SKEL: Denies joint swelling or joint stiffness.  GI: Denies abdominal pain, N/V/C/D.  NEURO: Denies abnormal sensation, tingling of the skin, or numbness.      EXAM:   /59   Pulse 75   Temp 97.3 °F (36.3 °C)   Resp 16   Ht 5' 3\" (1.6 m)   Wt 140 lb (63.5 kg)   LMP 2024 (Approximate)   SpO2 99%   BMI 24.80 kg/m²   GENERAL: well developed, well nourished,in no apparent  distress  SKIN: Rash/lesion(s): left lower lip +swelling, no tenderness; no drainage;  no s/s secondary infection; + blanching  EYES: PERRLA, EOMI, conjunctiva are clear  HENT: Head atraumatic, normocephalic. TM's WNL bilaterally. Normal external nose. Nasal mucosa pink without edema. No erythema of the throat. Oropharynx moist without lesions.  NECK:  Supple. Non tender.  LUNGS: Clear to auscultation bilaterally.  No wheezing, rhonchi, or rales.  No diminished breath sounds. No increased work of breathing.   CARDIO: RRR without murmur  LYMPH: No lymphadenopathy.     ASSESSMENT AND PLAN:   Audra Brown is a 43 year old female who presents for evaluation of a rash. Findings are consistent with:    ASSESSMENT:  Encounter Diagnosis   Name Primary?    Lip swelling Yes   1. Lip swelling  Suspect allergic reaction to facial.   - predniSONE 20 MG Oral Tab; Take 2 tablets (40 mg total) by mouth daily for 3 days. Take with food  Dispense: 6 tablet; Refill: 0    Benadryl q4hrs prn, if not improved prednisone sent. Any worsening symptoms to ER.   PLAN: Meds and instructions as listed below.  Comfort measures as described in Patient Instructions.  Skin care discussed with patient.     Meds & Refills for this Visit:  Requested Prescriptions     Signed Prescriptions Disp Refills    predniSONE 20 MG Oral Tab 6 tablet 0     Sig: Take 2 tablets (40 mg total) by mouth daily for 3 days. Take with food       Risks, benefits, and side effects of medication explained and discussed.      Patient Instructions   Take benadryl 25mg now. Then every 4 hours as needed.   If lip swelling doesn't go down with benadryl, start prednisone. Any worsening symptoms to ER    The patient indicates understanding of these issues and agrees to the plan.  The patient is asked to f/u with PCP in 3 days if sx's persist or worsen.

## 2024-05-05 NOTE — PATIENT INSTRUCTIONS
Take benadryl 25mg now. Then every 4 hours as needed.   If lip swelling doesn't go down with benadryl, start prednisone. Any worsening symptoms to ER

## 2024-05-19 LAB
T3, FREE: 2.9 PG/ML (ref 2.3–4.2)
T4, FREE: 1.4 NG/DL (ref 0.8–1.8)
TSH: 1.68 MIU/L

## 2024-05-24 DIAGNOSIS — E03.9 HYPOTHYROIDISM, UNSPECIFIED TYPE: ICD-10-CM

## 2024-05-24 RX ORDER — LEVOTHYROXINE SODIUM 88 UG/1
88 TABLET ORAL
Qty: 30 TABLET | Refills: 0 | Status: SHIPPED | OUTPATIENT
Start: 2024-05-24

## 2024-06-18 DIAGNOSIS — E03.9 HYPOTHYROIDISM, UNSPECIFIED TYPE: ICD-10-CM

## 2024-06-18 RX ORDER — LEVOTHYROXINE SODIUM 88 UG/1
88 TABLET ORAL
Qty: 30 TABLET | Refills: 0 | Status: SHIPPED | OUTPATIENT
Start: 2024-06-18 | End: 2024-06-18

## 2024-06-18 RX ORDER — LEVOTHYROXINE SODIUM 88 UG/1
88 TABLET ORAL
Qty: 90 TABLET | Refills: 0 | Status: SHIPPED | OUTPATIENT
Start: 2024-06-18

## 2024-06-19 DIAGNOSIS — E03.9 HYPOTHYROIDISM, UNSPECIFIED TYPE: ICD-10-CM

## 2024-06-20 RX ORDER — LEVOTHYROXINE SODIUM 88 UG/1
88 TABLET ORAL
Qty: 30 TABLET | Refills: 0 | OUTPATIENT
Start: 2024-06-20

## 2024-08-01 DIAGNOSIS — E03.9 HYPOTHYROIDISM, UNSPECIFIED TYPE: ICD-10-CM

## 2024-08-01 RX ORDER — LEVOTHYROXINE SODIUM 88 UG/1
88 TABLET ORAL
Qty: 90 TABLET | Refills: 0 | Status: SHIPPED | OUTPATIENT
Start: 2024-08-01

## 2024-10-07 ENCOUNTER — PATIENT MESSAGE (OUTPATIENT)
Dept: FAMILY MEDICINE CLINIC | Facility: CLINIC | Age: 44
End: 2024-10-07

## 2024-10-07 DIAGNOSIS — E55.9 VITAMIN D DEFICIENCY: Primary | ICD-10-CM

## 2024-10-07 RX ORDER — ERGOCALCIFEROL 1.25 MG/1
50000 CAPSULE, LIQUID FILLED ORAL WEEKLY
Qty: 20 CAPSULE | Refills: 0 | OUTPATIENT
Start: 2024-10-07

## 2024-10-08 RX ORDER — ERGOCALCIFEROL 1.25 MG/1
50000 CAPSULE, LIQUID FILLED ORAL WEEKLY
Qty: 12 CAPSULE | Refills: 0 | Status: SHIPPED | OUTPATIENT
Start: 2024-10-08

## 2024-10-21 DIAGNOSIS — E03.9 HYPOTHYROIDISM, UNSPECIFIED TYPE: ICD-10-CM

## 2024-10-21 RX ORDER — LEVOTHYROXINE SODIUM 88 UG/1
88 TABLET ORAL
Qty: 90 TABLET | Refills: 0 | Status: SHIPPED | OUTPATIENT
Start: 2024-10-21 | End: 2025-01-16

## 2024-12-03 DIAGNOSIS — E55.9 VITAMIN D DEFICIENCY: ICD-10-CM

## 2024-12-03 RX ORDER — ERGOCALCIFEROL 1.25 MG/1
50000 CAPSULE, LIQUID FILLED ORAL WEEKLY
Qty: 12 CAPSULE | Refills: 0 | Status: SHIPPED | OUTPATIENT
Start: 2024-12-03

## 2024-12-03 NOTE — TELEPHONE ENCOUNTER
.A refill request was received for:  Requested Prescriptions     Pending Prescriptions Disp Refills    ERGOCALCIFEROL 1.25 MG (38597 UT) Oral Cap [Pharmacy Med Name: VITAMIN D2 50,000IU (ERGO) CAP RX] 12 capsule 0     Sig: TAKE 1 CAPSULE BY MOUTH 1 TIME A WEEK       Last refill date:   11/8/24    Last office visit: 2/26/24    Follow up due:  No future appointments.

## 2025-01-16 DIAGNOSIS — E03.9 HYPOTHYROIDISM, UNSPECIFIED TYPE: ICD-10-CM

## 2025-01-16 RX ORDER — LEVOTHYROXINE SODIUM 88 UG/1
88 TABLET ORAL
Qty: 90 TABLET | Refills: 0 | Status: SHIPPED | OUTPATIENT
Start: 2025-01-16 | End: 2025-03-17

## 2025-01-27 ENCOUNTER — APPOINTMENT (OUTPATIENT)
Dept: GENERAL RADIOLOGY | Age: 45
End: 2025-01-27
Attending: EMERGENCY MEDICINE
Payer: COMMERCIAL

## 2025-01-27 ENCOUNTER — HOSPITAL ENCOUNTER (OUTPATIENT)
Age: 45
Discharge: HOME | End: 2025-01-27
Attending: EMERGENCY MEDICINE
Payer: COMMERCIAL

## 2025-01-27 VITALS
OXYGEN SATURATION: 100 % | WEIGHT: 137 LBS | TEMPERATURE: 100 F | DIASTOLIC BLOOD PRESSURE: 85 MMHG | BODY MASS INDEX: 24.27 KG/M2 | HEART RATE: 74 BPM | HEIGHT: 63 IN | RESPIRATION RATE: 18 BRPM | SYSTOLIC BLOOD PRESSURE: 120 MMHG

## 2025-01-27 DIAGNOSIS — J11.1 INFLUENZA: Primary | ICD-10-CM

## 2025-01-27 LAB
ATRIAL RATE: 78 BPM
P AXIS: 66 DEGREES
P-R INTERVAL: 158 MS
POCT INFLUENZA A: POSITIVE
POCT INFLUENZA B: NEGATIVE
Q-T INTERVAL: 372 MS
QRS DURATION: 62 MS
QTC CALCULATION (BEZET): 424 MS
R AXIS: 12 DEGREES
SARS-COV-2 RNA RESP QL NAA+PROBE: NOT DETECTED
T AXIS: 60 DEGREES
VENTRICULAR RATE: 78 BPM

## 2025-01-27 PROCEDURE — 99215 OFFICE O/P EST HI 40 MIN: CPT

## 2025-01-27 PROCEDURE — 71046 X-RAY EXAM CHEST 2 VIEWS: CPT | Performed by: EMERGENCY MEDICINE

## 2025-01-27 PROCEDURE — 93010 ELECTROCARDIOGRAM REPORT: CPT

## 2025-01-27 PROCEDURE — 99214 OFFICE O/P EST MOD 30 MIN: CPT

## 2025-01-27 PROCEDURE — 93005 ELECTROCARDIOGRAM TRACING: CPT

## 2025-01-27 PROCEDURE — 87502 INFLUENZA DNA AMP PROBE: CPT | Performed by: EMERGENCY MEDICINE

## 2025-01-27 NOTE — DISCHARGE INSTRUCTIONS
Follow-up for further evaluation primary physician.  Return if new or worse symptoms.  Rest with plenty fluids.  Tylenol, ibuprofen as needed.  Throat lozenges.

## 2025-01-27 NOTE — ED PROVIDER NOTES
Patient Seen in: [facility]      History     Chief Complaint   Patient presents with    Cough     Stated Complaint: Cold - tightness in chest    Subjective:   HPI      Patient is a 44-year-old female who states last Wednesday she started with cough sore throat, nasal congestion.  Patient states she feels some slight tightness in the chest when she coughs.  Patient denies productive cough.  No shortness of breath, no abdominal pain, no leg pain or leg swelling.  Remainder of review of systems negative.    Objective:     Past Medical History:    Back pain    Not sure    Bloating    It depends on what i eat and befor my periods    Constipation    Fatigue    Flatulence/gas pain/belching    It depends on what i eat    Frequent use of laxatives    Senna leaves powder    Heartburn    Occasional; depends on eating    Thyroid disease              Past Surgical History:   Procedure Laterality Date      2008/ 2012    x2                No pertinent social history.            Review of Systems    Positive for stated complaint: Cold - tightness in chest  Other systems are as noted in HPI.  Constitutional and vital signs reviewed.      All other systems reviewed and negative except as noted above.    Physical Exam     ED Triage Vitals [25 1148]   /85   Pulse 74   Resp 18   Temp 99.8 °F (37.7 °C)   Temp src Oral   SpO2 100 %   O2 Device None (Room air)       Current Vitals:   Vital Signs  BP: 120/85  Pulse: 74  Resp: 18  Temp: 99.8 °F (37.7 °C)  Temp src: Oral    Oxygen Therapy  SpO2: 100 %  O2 Device: None (Room air)        Physical Exam   GENERAL: Patient resting comfortably on the cart in no acute distress.  HEENT: Extraocular muscles intact, pupils equal round reactive to light.  Mouth normal, neck supple, no meningismus.  Tympanic membranes normal bilaterally  LUNGS: Lungs clear to auscultation bilaterally.  CARDIOVASCULAR: + S1-S2, regular rate and rhythm, no murmurs.  EXTREMITIES: Full  range of motion, no tenderness, good capillary refill.  SKIN: No rash, good turgor.  NEURO: Patient answers questions appropriately.  No focal deficits appreciated.        ED Course     Labs Reviewed   POCT FLU TEST - Abnormal; Notable for the following components:       Result Value    POCT INFLUENZA A Positive (*)     All other components within normal limits    Narrative:     This assay is a rapid molecular in vitro test utilizing nucleic acid amplification of influenza A and B viral RNA.   RAPID SARS-COV-2 BY PCR - Normal     EKG    Rate, intervals and axes as noted on EKG Report.  Rate: 78  Rhythm: Sinus Rhythm  Reading: Normal sinus rhythm, no acute changes                Chest x-ray  CONCLUSION:  No acute disease.    Independent reviewed by myself, no pneumothorax       MDM      Patient did test positive for influenza A.  Patient has been on 48-hour window for Tamiflu.  Patient felt comfortable going home.  Recommend rest, plenty fluids.  Tylenol, ibuprofen.  Follow-up for further evaluation.  Return if new or worse symptoms.  Did consider pneumonia, influenza, ACS        Medical Decision Making      Disposition and Plan     Clinical Impression:  1. Influenza         Disposition:  Discharge  1/27/2025 12:50 pm    Follow-up:  Harmony Avina DO  [address] [place] [id number]  [phone]    In 3 days            Medications Prescribed:  Current Discharge Medication List              Supplementary Documentation:

## 2025-01-27 NOTE — ED INITIAL ASSESSMENT (HPI)
Pt here c/o cough since last Wed.    Also c/o tightness to chest when coughing and pain to mid back.   Denies any n/v.   Denies feeling sob.

## 2025-02-10 ENCOUNTER — HOSPITAL ENCOUNTER (OUTPATIENT)
Age: 45
Discharge: HOME | End: 2025-02-10
Payer: COMMERCIAL

## 2025-02-10 VITALS
BODY MASS INDEX: 24.27 KG/M2 | TEMPERATURE: 99 F | SYSTOLIC BLOOD PRESSURE: 115 MMHG | HEART RATE: 72 BPM | DIASTOLIC BLOOD PRESSURE: 80 MMHG | OXYGEN SATURATION: 100 % | WEIGHT: 137 LBS | RESPIRATION RATE: 18 BRPM | HEIGHT: 63 IN

## 2025-02-10 DIAGNOSIS — H66.003 NON-RECURRENT ACUTE SUPPURATIVE OTITIS MEDIA OF BOTH EARS WITHOUT SPONTANEOUS RUPTURE OF TYMPANIC MEMBRANES: Primary | ICD-10-CM

## 2025-02-10 DIAGNOSIS — R59.1 LYMPHADENOPATHY: ICD-10-CM

## 2025-02-10 PROCEDURE — 99213 OFFICE O/P EST LOW 20 MIN: CPT

## 2025-02-10 RX ORDER — AMOXICILLIN 875 MG/1
875 TABLET, COATED ORAL 2 TIMES DAILY
Qty: 20 TABLET | Refills: 0 | Status: SHIPPED | OUTPATIENT
Start: 2025-02-10 | End: 2025-02-20

## 2025-02-10 NOTE — ED INITIAL ASSESSMENT (HPI)
Patient reports sore throat and ear pain for since Saturday.  Patient reports she recently was recovering from the flu.

## 2025-02-11 NOTE — ED PROVIDER NOTES
Patient Seen in: [facility]      History     Chief Complaint   Patient presents with    Sore Throat     Stated Complaint: Cough - Throat pain and ear pain    Subjective:   44-year-old female presents to immediate care with bilateral ear pain, sore throat and tenderness.  Patient states she is just recovered from influenza A over the last 2 weeks states she started having increased pain and pressure to her ears along with pain in her throat and enlarged lymph nodes.          Objective:     Past Medical History:    Back pain    Not sure    Bloating    It depends on what i eat and befor my periods    Constipation    Fatigue    Flatulence/gas pain/belching    It depends on what i eat    Frequent use of laxatives    Senna leaves powder    Heartburn    Occasional; depends on eating    Thyroid disease              Past Surgical History:   Procedure Laterality Date      2008/ 2012    x2                Social History     Socioeconomic History    Marital status:    Tobacco Use    Smoking status: Never    Smokeless tobacco: Never   Substance and Sexual Activity    Alcohol use: Yes    Drug use: No   Other Topics Concern    Caffeine Concern Yes    Exercise Yes     Social Drivers of Health      Received from [facility], [facility]    Housing Stability              Review of Systems   Constitutional: Negative.    Respiratory: Negative.     Cardiovascular: Negative.    Gastrointestinal: Negative.    Skin: Negative.    Neurological: Negative.        Positive for stated complaint: Cough - Throat pain and ear pain  Other systems are as noted in HPI.  Constitutional and vital signs reviewed.      All other systems reviewed and negative except as noted above.    Physical Exam     ED Triage Vitals [02/10/25 1727]   /80   Pulse 72   Resp 18   Temp 98.9 °F (37.2 °C)   Temp src Oral   SpO2 100 %   O2 Device None (Room air)       Current Vitals:   Vital  Signs  BP: 115/80  Pulse: 72  Resp: 18  Temp: 98.9 °F (37.2 °C)  Temp src: Oral    Oxygen Therapy  SpO2: 100 %  O2 Device: None (Room air)        Physical Exam  Vitals and nursing note reviewed.   Constitutional:       General: She is not in acute distress.  HENT:      Head: Normocephalic.      Right Ear: A middle ear effusion is present. Tympanic membrane is erythematous.      Left Ear: A middle ear effusion is present. Tympanic membrane is erythematous.      Mouth/Throat:      Tonsils: 1+ on the right. 1+ on the left.   Cardiovascular:      Rate and Rhythm: Normal rate.   Pulmonary:      Effort: Pulmonary effort is normal.   Musculoskeletal:         General: Normal range of motion.   Skin:     General: Skin is warm and dry.   Neurological:      General: No focal deficit present.      Mental Status: She is alert and oriented to person, place, and time.             ED Course   Labs Reviewed - No data to display                MDM            Medical Decision Making  Pertinent Labs & Imaging studies reviewed. (See chart for details).  Patient coming in with bilateral ear pain, throat pain.   Differential diagnosis includes otitis, lymphadenopathy     Patient is comfortable with plan of care.     Overall Pt looks good. Non-toxic, well-hydrated and in no respiratory distress. Vital signs are reassuring. Exam is reassuring. I do not believe pt requires and additional diagnostic studies or intervention. I believe pt can be discharged home to continue evaluation as an outpatient. Follow-up provider given. Discharge instructions given and reviewed. Return for any problems. All understand and agrees with the plan.        Problems Addressed:  Lymphadenopathy: acute illness or injury  Non-recurrent acute suppurative otitis media of both ears without spontaneous rupture of tympanic membranes: acute illness or injury    Risk  Prescription drug management.        Disposition and Plan     Clinical Impression:  1. Non-recurrent  acute suppurative otitis media of both ears without spontaneous rupture of tympanic membranes    2. Lymphadenopathy         Disposition:  Discharge  2/10/2025  6:16 pm    Follow-up:  Harmony Avina DO  [address] [place] [id number]  [phone]                Medications Prescribed:  Discharge Medication List as of 2/10/2025  6:35 PM        START taking these medications    Details   amoxicillin 875 MG Oral Tab Take 1 tablet (875 mg total) by mouth 2 (two) times daily for 10 days., Normal, Disp-20 tablet, R-0                 Supplementary Documentation:

## 2025-02-17 ENCOUNTER — PATIENT MESSAGE (OUTPATIENT)
Dept: FAMILY MEDICINE CLINIC | Facility: CLINIC | Age: 45
End: 2025-02-17

## 2025-02-17 DIAGNOSIS — E56.9 VITAMIN DEFICIENCY: ICD-10-CM

## 2025-02-17 DIAGNOSIS — Z00.00 ROUTINE GENERAL MEDICAL EXAMINATION AT A HEALTH CARE FACILITY: ICD-10-CM

## 2025-02-17 DIAGNOSIS — E03.9 HYPOTHYROIDISM, UNSPECIFIED TYPE: ICD-10-CM

## 2025-02-17 DIAGNOSIS — Z12.31 ENCOUNTER FOR SCREENING MAMMOGRAM FOR MALIGNANT NEOPLASM OF BREAST: Primary | ICD-10-CM

## 2025-02-17 DIAGNOSIS — R79.0 LOW FERRITIN: ICD-10-CM

## 2025-02-17 NOTE — TELEPHONE ENCOUNTER
Patient has physical scheduled 3/17/25 with Dr Avina  Last mammogram 3/9/24  Order placed for mammogram    Last full set of labs done 2/24/24  Lipid,CMP,CBC,B12,TSH and free T4 pended  Vitamin D lab already in system  Do you want to order ferritin? Last ferritin result was  7  Please advise

## 2025-02-18 NOTE — TELEPHONE ENCOUNTER
Awaiting response from pt on which lab--[facility] or [facility]? Vit d is in system for [facility], oct, may be too old at this point if she goes there, may need to re-order

## 2025-03-09 LAB
ABSOLUTE BASOPHILS: 31 CELLS/UL (ref 0–200)
ABSOLUTE EOSINOPHILS: 177 CELLS/UL (ref 15–500)
ABSOLUTE LYMPHOCYTES: 1695 CELLS/UL (ref 850–3900)
ABSOLUTE MONOCYTES: 395 CELLS/UL (ref 200–950)
ABSOLUTE NEUTROPHILS: 2902 CELLS/UL (ref 1500–7800)
ALBUMIN/GLOBULIN RATIO: 1.9 (CALC) (ref 1–2.5)
ALBUMIN: 4.3 G/DL (ref 3.6–5.1)
ALKALINE PHOSPHATASE: 71 U/L (ref 31–125)
ALT: 15 U/L (ref 6–29)
AST: 16 U/L (ref 10–30)
BASOPHILS: 0.6 %
BILIRUBIN, TOTAL: 0.4 MG/DL (ref 0.2–1.2)
BUN: 14 MG/DL (ref 7–25)
CALCIUM: 9.3 MG/DL (ref 8.6–10.2)
CARBON DIOXIDE: 27 MMOL/L (ref 20–32)
CHLORIDE: 105 MMOL/L (ref 98–110)
CHOL/HDLC RATIO: 3.7 (CALC)
CHOLESTEROL, TOTAL: 176 MG/DL
CREATININE: 0.74 MG/DL (ref 0.5–0.99)
EGFR: 103 ML/MIN/1.73M2
EOSINOPHILS: 3.4 %
FERRITIN: 8 NG/ML (ref 16–232)
GLOBULIN: 2.3 G/DL (CALC) (ref 1.9–3.7)
GLUCOSE: 88 MG/DL (ref 65–99)
HDL CHOLESTEROL: 48 MG/DL
HEMATOCRIT: 38.6 % (ref 35–45)
HEMOGLOBIN: 12.1 G/DL (ref 11.7–15.5)
LDL-CHOLESTEROL: 105 MG/DL (CALC)
LYMPHOCYTES: 32.6 %
MCH: 28.4 PG (ref 27–33)
MCHC: 31.3 G/DL (ref 32–36)
MCV: 90.6 FL (ref 80–100)
MONOCYTES: 7.6 %
MPV: 11.9 FL (ref 7.5–12.5)
NEUTROPHILS: 55.8 %
NON-HDL CHOLESTEROL: 128 MG/DL (CALC)
PLATELET COUNT: 277 THOUSAND/UL (ref 140–400)
POTASSIUM: 4.9 MMOL/L (ref 3.5–5.3)
PROTEIN, TOTAL: 6.6 G/DL (ref 6.1–8.1)
RDW: 13.2 % (ref 11–15)
RED BLOOD CELL COUNT: 4.26 MILLION/UL (ref 3.8–5.1)
SODIUM: 139 MMOL/L (ref 135–146)
T4, FREE: 1.8 NG/DL (ref 0.8–1.8)
TRIGLYCERIDES: 135 MG/DL
TSH: 2.3 MIU/L
VITAMIN B12: 316 PG/ML (ref 200–1100)
VITAMIN D, 25-OH, TOTAL: 35 NG/ML (ref 30–100)
WHITE BLOOD CELL COUNT: 5.2 THOUSAND/UL (ref 3.8–10.8)

## 2025-03-10 ENCOUNTER — HOSPITAL ENCOUNTER (OUTPATIENT)
Dept: MAMMOGRAPHY | Age: 45
Discharge: HOME | End: 2025-03-10
Attending: FAMILY MEDICINE
Payer: COMMERCIAL

## 2025-03-10 ENCOUNTER — TELEPHONE (OUTPATIENT)
Dept: FAMILY MEDICINE CLINIC | Facility: CLINIC | Age: 45
End: 2025-03-10

## 2025-03-10 DIAGNOSIS — Z12.31 ENCOUNTER FOR SCREENING MAMMOGRAM FOR MALIGNANT NEOPLASM OF BREAST: ICD-10-CM

## 2025-03-10 PROCEDURE — 77067 SCR MAMMO BI INCL CAD: CPT | Performed by: FAMILY MEDICINE

## 2025-03-10 PROCEDURE — 77063 BREAST TOMOSYNTHESIS BI: CPT | Performed by: FAMILY MEDICINE

## 2025-03-10 NOTE — TELEPHONE ENCOUNTER
Patient came into the office and reported that she sent a [employer] message today regarding her menstrual cycle. Says it has been going on since February 20th. Says the flow has been light but also has some spotting. She is concerned and is trying to get in as soon as possible with [facility]. Please advise.

## 2025-03-17 ENCOUNTER — OFFICE VISIT (OUTPATIENT)
Dept: FAMILY MEDICINE CLINIC | Facility: CLINIC | Age: 45
End: 2025-03-17
Payer: COMMERCIAL

## 2025-03-17 ENCOUNTER — TELEPHONE (OUTPATIENT)
Age: 45
End: 2025-03-17

## 2025-03-17 VITALS
WEIGHT: 141 LBS | SYSTOLIC BLOOD PRESSURE: 106 MMHG | HEIGHT: 63 IN | OXYGEN SATURATION: 100 % | RESPIRATION RATE: 16 BRPM | BODY MASS INDEX: 24.98 KG/M2 | DIASTOLIC BLOOD PRESSURE: 66 MMHG | HEART RATE: 80 BPM

## 2025-03-17 DIAGNOSIS — E03.9 HYPOTHYROIDISM, UNSPECIFIED TYPE: ICD-10-CM

## 2025-03-17 DIAGNOSIS — D50.9 IRON DEFICIENCY ANEMIA, UNSPECIFIED IRON DEFICIENCY ANEMIA TYPE: ICD-10-CM

## 2025-03-17 DIAGNOSIS — Z00.00 ANNUAL PHYSICAL EXAM: Primary | ICD-10-CM

## 2025-03-17 DIAGNOSIS — Z12.39 SCREENING FOR BREAST CANCER USING NON-MAMMOGRAM MODALITY: ICD-10-CM

## 2025-03-17 DIAGNOSIS — R92.30 DENSE BREAST: ICD-10-CM

## 2025-03-17 DIAGNOSIS — R94.31 ABNORMAL EKG: ICD-10-CM

## 2025-03-17 DIAGNOSIS — R01.1 NEWLY RECOGNIZED HEART MURMUR: ICD-10-CM

## 2025-03-17 RX ORDER — LEVOTHYROXINE SODIUM 88 UG/1
88 TABLET ORAL
Qty: 90 TABLET | Refills: 1 | Status: SHIPPED | OUTPATIENT
Start: 2025-03-17

## 2025-03-17 RX ORDER — ERGOCALCIFEROL 1.25 MG/1
50000 CAPSULE, LIQUID FILLED ORAL WEEKLY
Qty: 12 CAPSULE | Refills: 2 | Status: SHIPPED | OUTPATIENT
Start: 2025-03-17

## 2025-03-17 NOTE — PROGRESS NOTES
HPI:   Audra Brown is a 44 year old female who presents for a complete physical exam.     Wt Readings from Last 6 Encounters:   03/17/25 141 lb (64 kg)   02/10/25 137 lb (62.1 kg)   01/27/25 137 lb (62.1 kg)   05/05/24 140 lb (63.5 kg)   02/26/24 142 lb (64.4 kg)   12/05/23 141 lb (64 kg)     Body mass index is 24.98 kg/m².     CHOLESTEROL, TOTAL (mg/dL)   Date Value   03/08/2025 176   02/24/2024 205 (H)   01/21/2023 167     HDL CHOLESTEROL (mg/dL)   Date Value   03/08/2025 48 (L)   02/24/2024 53   01/21/2023 55     LDL-CHOLESTEROL (mg/dL (calc))   Date Value   03/08/2025 105 (H)   02/24/2024 127 (H)   01/21/2023 96     AST (U/L)   Date Value   03/08/2025 16   02/24/2024 13   01/21/2023 13     ALT (U/L)   Date Value   03/08/2025 15   02/24/2024 9   01/21/2023 13       last pap- 2023-  pt sees gyn Dr. Martinez  all previous paps normal  No vaginal discharge  No bladder dysfunction  Irregular menses   birth control- condoms   Not performing self breast exams  last mammogram- 20245  dexa - never   c-scope - never   No calcium and vit D supplementation  No family history of breast colon or ovarian cancer    Has not tolerated oral iron       Component      Latest Ref Rng 2/24/2024 5/18/2024 3/8/2025   WBC      3.8 - 10.8 Thousand/uL 5.2   5.2    RBC      3.80 - 5.10 Million/uL 4.54   4.26    Hemoglobin      11.7 - 15.5 g/dL 12.9   12.1    Hematocrit      35.0 - 45.0 % 39.4   38.6    MCV      80.0 - 100.0 fL 86.8   90.6    MCH      27.0 - 33.0 pg 28.4   28.4    MCHC      32.0 - 36.0 g/dL 32.7   31.3 (L)    RDW      11.0 - 15.0 % 13.0   13.2    Platelet Count      140 - 400 Thousand/uL 274   277    MPV      7.5 - 12.5 fL 11.5   11.9    Neutrophils Absolute      1,500 - 7,800 cells/uL 2,730   2,902    Lymphocytes Absolute      850 - 3,900 cells/uL 1,773   1,695    Monocytes Absolute      200 - 950 cells/uL 463   395    Eosinophils Absolute      15 - 500 cells/uL 213   177    Basophils Absolute      0 - 200 cells/uL 21   31     Neutrophils %      % 52.5   55.8    Lymphocytes %      % 34.1   32.6    Monocytes %      % 8.9   7.6    Eosinophils %      % 4.1   3.4    Basophils %      % 0.4   0.6    Glucose      65 - 99 mg/dL 85   88    BUN      7 - 25 mg/dL 10   14    CREATININE      0.50 - 0.99 mg/dL 0.71   0.74    EGFR      > OR = 60 mL/min/1.73m2 109   103    BUN/CREATININE RATIO      6 - 22 (calc) SEE NOTE:   SEE NOTE:    Sodium      135 - 146 mmol/L 137   139    Potassium      3.5 - 5.3 mmol/L 4.2   4.9    Chloride      98 - 110 mmol/L 102   105    Carbon Dioxide, Total      20 - 32 mmol/L 27   27    CALCIUM      8.6 - 10.2 mg/dL 9.6   9.3    PROTEIN, TOTAL      6.1 - 8.1 g/dL 7.0   6.6    Albumin      3.6 - 5.1 g/dL 4.5   4.3    Globulin      1.9 - 3.7 g/dL (calc) 2.5   2.3    A/G Ratio      1.0 - 2.5 (calc) 1.8   1.9    Total Bilirubin      0.2 - 1.2 mg/dL 0.6   0.4    Alkaline Phosphatase      31 - 125 U/L 57   71    AST (SGOT)      10 - 30 U/L 13   16    ALT (SGPT)      6 - 29 U/L 9   15    Cholesterol, Total      <200 mg/dL 205 (H)   176    HDL Cholesterol      > OR = 50 mg/dL 53   48 (L)    Triglycerides      <150 mg/dL 140   135    LDL Cholesterol Calc      mg/dL (calc) 127 (H)   105 (H)    Chol/HDL Ratio      <5.0 (calc) 3.9   3.7    NON-HDL CHOLESTEROL      <130 mg/dL (calc) 152 (H)   128    FERRITIN      16 - 232 ng/mL 7 (L)   8 (L)    T4,Free (Direct)      0.8 - 1.8 ng/dL 1.2  1.4  1.8    Vitamin B12      200 - 1,100 pg/mL 628   316    VITAMIN D, 25-OH, TOTAL      30 - 100 ng/mL 26 (L)   35    TSH      mIU/L 9.30 (H)  1.68  2.30    T3 FREE      2.3 - 4.2 pg/mL  2.9        Legend:  (H) High  (L) Low    Current Outpatient Medications   Medication Sig Dispense Refill    LEVOTHYROXINE 88 MCG Oral Tab TAKE 1 TABLET(88 MCG) BY MOUTH BEFORE BREAKFAST 90 tablet 0    ERGOCALCIFEROL 1.25 MG (20263 UT) Oral Cap TAKE 1 CAPSULE BY MOUTH 1 TIME A WEEK (Patient not taking: Reported on 3/17/2025) 12 capsule 0    ergocalciferol 1.25 MG (17977  UT) Oral Cap Take 1 capsule (50,000 Units total) by mouth once a week. Once weekly x 20 weeks.  Take with food (Patient not taking: Reported on 3/17/2025) 20 capsule 0    Ferric Maltol (ACCRUFER) 30 MG Oral Cap Take 1 capsule by mouth daily. (Patient not taking: Reported on 2024)      Cholecalciferol (VITAMIN D3) 91194 UNITS Oral Cap Take 1 tablet by mouth. 2000 units  (Patient not taking: Reported on 3/17/2025)      Vitamin B-12 (VITAMIN B12) 500 MCG Oral Tab Take 1 tablet (500 mcg total) by mouth daily.        Past Medical History:    Back pain    Not sure    Bloating    It depends on what i eat and befor my periods    Constipation    Fatigue    Flatulence/gas pain/belching    It depends on what i eat    Frequent use of laxatives    Senna leaves powder    Heartburn    Occasional; depends on eating    Thyroid disease      Past Surgical History:   Procedure Laterality Date      2008/ 2012    x2      Family History   Problem Relation Age of Onset    Heart Attack Father     Hypertension Father     Heart Disorder Father         2 heart attacks    Heart Disorder Mother         2 minor attacks    Hypertension Mother     Prostate Cancer Maternal Grandfather     Hypertension Paternal Grandmother     Hypertension Paternal Grandfather     Heart Disorder Paternal Grandfather         CAD      Social History:   Social History     Socioeconomic History    Marital status:    Tobacco Use    Smoking status: Never    Smokeless tobacco: Never   Substance and Sexual Activity    Alcohol use: Yes    Drug use: No   Other Topics Concern    Caffeine Concern Yes    Exercise Yes     Social Drivers of Health      Received from [facility], [facility]    Housing Stability     Occ:. :  Children:     Exercise: none  Diet: good / high fiber      REVIEW OF SYSTEMS:   GENERAL: feels well otherwise  SKIN: denies any unusual skin lesions  EYES:denies blurred vision or  double vision  HEENT: denies nasal congestion, sinus pain or ST  LUNGS: denies shortness of breath with exertion  CARDIOVASCULAR: denies chest pain on exertion  GI: denies abdominal pain,denies heartburn  : denies dysuria, vaginal discharge or itching   MUSCULOSKELETAL: denies back pain  NEURO: denies headaches  PSYCHE: denies depression or anxiety  HEMATOLOGIC: denies hx of anemia  ENDOCRINE:thyroid history  ALL/ASTHMA: denies hx of allergy or asthma    EXAM:   /66   Pulse 80   Resp 16   Ht 5' 3\" (1.6 m)   Wt 141 lb (64 kg)   LMP 02/20/2025 (Exact Date)   SpO2 100%   BMI 24.98 kg/m²   Body mass index is 24.98 kg/m².   GENERAL: alert and oriented X 3, well developed, well nourished,in no apparent distress  CARDIO: RRR without murmur  LUNGS: clear to auscultation  NECK: supple,no adenopathy, no thyromegaly   HEENT: atraumatic, normocephalic,ears and throat are clear  EYES:PERRLA, EOMI, normal,conjunctiva are clear  SKIN: norashes,no suspicious lesions  GI: good BS's,no masses, HSM or tenderness  CHEST: no chest tenderness  BREAST: no axillary no masses no nipple discharge bilaterally   MUSCULOSKELETAL: back is not tender,FROM of the back  EXTREMITIES: no cyanosis, clubbing or edema  NEURO: cranial nerves are intact,motor and sensory are grossly intact    ASSESSMENT AND PLAN:   Audra Brown is a 44 year old female who presents with     1. Annual physical exam      2. Iron deficiency    - Oncology/Hematology Referral - In Network    3. Hypothyroidism, unspecified type    - Free T4, (Free Thyroxine)  - Assay, Thyroid Stim Hormone  - Free T3 (Triiodothryronine)  - levothyroxine 88 MCG Oral Tab; Take 1 tablet (88 mcg total) by mouth before breakfast.  Dispense: 90 tablet; Refill: 0      Questions answered and patient indicates understanding of these issues and agrees to the plan.  Follow up in 1 year or sooner if needed.

## 2025-03-17 NOTE — TELEPHONE ENCOUNTER
Pt is calling to schedule a new consult appt.    New Consult- Audra Brown 11/18/1980  Referred to: Dr. Oralia Olguin  Referred by- Dr. Harmony Avina Ph: [phone]  Reason- Iron deficiency   Insurance- [employer] POS (E-verified)  Referral:  In Epic   Please give pt a call back. Thank you.

## 2025-03-24 ENCOUNTER — HOSPITAL ENCOUNTER (OUTPATIENT)
Dept: ULTRASOUND IMAGING | Age: 45
Discharge: HOME | End: 2025-03-24
Attending: FAMILY MEDICINE
Payer: COMMERCIAL

## 2025-03-24 DIAGNOSIS — Z12.39 SCREENING FOR BREAST CANCER USING NON-MAMMOGRAM MODALITY: ICD-10-CM

## 2025-03-24 DIAGNOSIS — R92.30 DENSE BREAST: ICD-10-CM

## 2025-03-24 PROCEDURE — 76641 ULTRASOUND BREAST COMPLETE: CPT | Performed by: FAMILY MEDICINE

## 2025-04-04 ENCOUNTER — OFFICE VISIT (OUTPATIENT)
Age: 45
End: 2025-04-04
Attending: INTERNAL MEDICINE
Payer: COMMERCIAL

## 2025-04-04 ENCOUNTER — HOSPITAL ENCOUNTER (OUTPATIENT)
Dept: CV DIAGNOSTICS | Age: 45
Discharge: HOME | End: 2025-04-04
Attending: FAMILY MEDICINE
Payer: COMMERCIAL

## 2025-04-04 VITALS
SYSTOLIC BLOOD PRESSURE: 114 MMHG | RESPIRATION RATE: 18 BRPM | TEMPERATURE: 97 F | WEIGHT: 142.5 LBS | BODY MASS INDEX: 25.25 KG/M2 | HEART RATE: 84 BPM | DIASTOLIC BLOOD PRESSURE: 76 MMHG | HEIGHT: 63 IN | OXYGEN SATURATION: 99 %

## 2025-04-04 DIAGNOSIS — R94.31 ABNORMAL EKG: ICD-10-CM

## 2025-04-04 DIAGNOSIS — R01.1 NEWLY RECOGNIZED HEART MURMUR: ICD-10-CM

## 2025-04-04 DIAGNOSIS — D50.0 IRON DEFICIENCY ANEMIA DUE TO CHRONIC BLOOD LOSS: Primary | ICD-10-CM

## 2025-04-04 LAB
ALBUMIN SERPL-MCNC: 4.7 G/DL (ref 3.2–4.8)
ALBUMIN/GLOB SERPL: 1.7 {RATIO} (ref 1–2)
ALP LIVER SERPL-CCNC: 67 U/L
ALT SERPL-CCNC: 25 U/L
ANION GAP SERPL CALC-SCNC: 8 MMOL/L (ref 0–18)
AST SERPL-CCNC: 22 U/L (ref ?–34)
BASOPHILS # BLD AUTO: 0.02 X10(3) UL (ref 0–0.2)
BASOPHILS NFR BLD AUTO: 0.4 %
BILIRUB SERPL-MCNC: 0.8 MG/DL (ref 0.3–1.2)
BUN BLD-MCNC: 8 MG/DL (ref 9–23)
CALCIUM BLD-MCNC: 9.7 MG/DL (ref 8.7–10.6)
CHLORIDE SERPL-SCNC: 103 MMOL/L (ref 98–112)
CO2 SERPL-SCNC: 28 MMOL/L (ref 21–32)
CREAT BLD-MCNC: 0.68 MG/DL
EGFRCR SERPLBLD CKD-EPI 2021: 111 ML/MIN/1.73M2 (ref 60–?)
EOSINOPHIL # BLD AUTO: 0.13 X10(3) UL (ref 0–0.7)
EOSINOPHIL NFR BLD AUTO: 2.3 %
ERYTHROCYTE [DISTWIDTH] IN BLOOD BY AUTOMATED COUNT: 14.2 %
FASTING STATUS PATIENT QL REPORTED: NO
FOLATE SERPL-MCNC: 23.4 NG/ML (ref 5.4–?)
GLOBULIN PLAS-MCNC: 2.7 G/DL (ref 2–3.5)
GLUCOSE BLD-MCNC: 89 MG/DL (ref 70–99)
HCT VFR BLD AUTO: 39.2 %
HGB BLD-MCNC: 12.9 G/DL
HGB RETIC QN AUTO: 30.6 PG (ref 28.2–36.6)
IMM GRANULOCYTES # BLD AUTO: 0.01 X10(3) UL (ref 0–1)
IMM GRANULOCYTES NFR BLD: 0.2 %
IMM RETICS NFR: 0.09 RATIO (ref 0.1–0.3)
IRON SATN MFR SERPL: 27 %
IRON SERPL-MCNC: 97 UG/DL
LYMPHOCYTES # BLD AUTO: 1.87 X10(3) UL (ref 1–4)
LYMPHOCYTES NFR BLD AUTO: 33.7 %
MCH RBC QN AUTO: 29.2 PG (ref 26–34)
MCHC RBC AUTO-ENTMCNC: 32.9 G/DL (ref 31–37)
MCV RBC AUTO: 88.7 FL
MONOCYTES # BLD AUTO: 0.44 X10(3) UL (ref 0.1–1)
MONOCYTES NFR BLD AUTO: 7.9 %
NEUTROPHILS # BLD AUTO: 3.08 X10 (3) UL (ref 1.5–7.7)
NEUTROPHILS # BLD AUTO: 3.08 X10(3) UL (ref 1.5–7.7)
NEUTROPHILS NFR BLD AUTO: 55.5 %
OSMOLALITY SERPL CALC.SUM OF ELEC: 286 MOSM/KG (ref 275–295)
PLATELET # BLD AUTO: 269 10(3)UL (ref 150–450)
POTASSIUM SERPL-SCNC: 4.2 MMOL/L (ref 3.5–5.1)
PROT SERPL-MCNC: 7.4 G/DL (ref 5.7–8.2)
RBC # BLD AUTO: 4.42 X10(6)UL
RETICS # AUTO: 51.7 X10(3) UL (ref 22.5–147.5)
RETICS/RBC NFR AUTO: 1.2 %
SODIUM SERPL-SCNC: 139 MMOL/L (ref 136–145)
TOTAL IRON BINDING CAPACITY: 354 UG/DL (ref 250–425)
TRANSFERRIN SERPL-MCNC: 290 MG/DL (ref 250–380)
WBC # BLD AUTO: 5.6 X10(3) UL (ref 4–11)

## 2025-04-04 PROCEDURE — 93306 TTE W/DOPPLER COMPLETE: CPT | Performed by: FAMILY MEDICINE

## 2025-04-04 PROCEDURE — 76376 3D RENDER W/INTRP POSTPROCES: CPT | Performed by: FAMILY MEDICINE

## 2025-04-04 NOTE — CONSULTS
[facility] Hematology & Oncology Initial Consultation Note    Patient Name: Audra Brown  Medical Record Number: [id number]    YOB: 1980   Date of Consultation: 2025     Reason for Consultation/Chief Complaint:  iron deficiency anemia  Physician requesting consultation: Harmony Avina DO      History of Present Illness:  Mrs. Brown is a 45 y/o F with PMHx of iron deficiency anemia and hypothyroidism who presents to our office for treatment recommendations of iron deficiency anemia.     She endorses generalized fatigue- constantly. Colonoscopy  was normal. Denies melena, hematochezia, or rectal bleeding. No dyspnea with exertion, or lightheadedness/dizziness with positional changes. Her periods are regular. No rectal bleeding or blood in stool reported.     Iron studies 3/8/25 with ferritin 8 ng/mL, Hb 12.1, plt 277, MCV 91.      Past Medical History:  Past Medical History:    Back pain    Not sure    Bloating    It depends on what i eat and befor my periods    Constipation    Fatigue    Flatulence/gas pain/belching    It depends on what i eat    Frequent use of laxatives    Senna leaves powder    Heartburn    Occasional; depends on eating    Iron deficiency anemia due to chronic blood loss    Thyroid disease       Past Surgical History:  Past Surgical History:   Procedure Laterality Date      2008/ 2012    x3       Current Outpatient Medications:  Medications Ordered Prior to Encounter[1]    Allergies:   Allergies[2]    Family Medical History:  Family History   Problem Relation Age of Onset    Heart Attack Father     Hypertension Father     Heart Disorder Father         2 heart attacks    Heart Disorder Mother         2 minor attacks    Hypertension Mother     Prostate Cancer Maternal Grandfather     Hypertension Paternal Grandmother     Hypertension Paternal Grandfather     Heart Disorder Paternal Grandfather         CAD       Social History:  Social History     Social  History Narrative    Not on file     Social History     Socioeconomic History    Marital status:    Tobacco Use    Smoking status: Never    Smokeless tobacco: Never   Substance and Sexual Activity    Alcohol use: Yes    Drug use: No   Other Topics Concern    Caffeine Concern Yes    Exercise Yes     Social Drivers of Health      Received from [facility], [facility]    Housing Stability       Review of Systems:  A 10-point ROS was done with pertinent positives and negatives per the HPI.     ECOG Performance Status: 0    Vital Signs:  Height: 160 cm (5' 3\") (04/04 1034)  Weight: 64.6 kg (142 lb 8 oz) (04/04 1034)  BSA (Calculated - sq m): 1.67 sq meters (04/04 1034)  Pulse: 84 (04/04 1034)  BP: 114/76 (04/04 1034)  Temp: 96.8 °F (36 °C) (04/04 1034)  Do Not Use - Resp Rate: --  SpO2: 99 % (04/04 1034)  Wt Readings from Last 6 Encounters:   04/04/25 64.6 kg (142 lb 8 oz)   03/17/25 64 kg (141 lb)   02/10/25 62.1 kg (137 lb)   01/27/25 62.1 kg (137 lb)   05/05/24 63.5 kg (140 lb)   02/26/24 64.4 kg (142 lb)       Physical Examination:  General: Well-nourished and well-appearing. No acute distress.  Eyes: EOMI, bilateral conjunctiva normal. Sclera anicteric.  ENT: Oropharynx is clear. Mucous membranes moist.  Lymph Nodes: No cervical or supraclavicular lymphadenopathy.  Respiratory: Lungs clear to auscultation bilaterally.  Cardiovascular: Regular rate and rhythm, no murmurs. No lower extremity edema.   GI: Soft, non-tender, non-distended with normoactive bowel sounds. No hepatosplenomegaly.  Heme: normal capillary refill. No ecchymosis, petechiae, or purpura.  Neurological: Alert and oriented. No apparent focal sensory or motor deficits  Skin: no rashes or lesions.  Psychiatric: Normal mood and affect.    Data Review  Laboratory Studies:  No results for input(s): \"WBC\", \"HGB\", \"HCT\", \"PLT\", \"MCV\", \"RDW\", \"NEPRELIM\" in the last 168 hours.  No results for input(s): \"NA\", \"K\",  \"CL\", \"CO2\", \"BUN\", \"CREATSERUM\", \"GFRAA\", \"GFRNAA\", \"GLU\", \"CA\", \"PHOS\", \"TP\", \"ALB\", \"ALKPHO\", \"AST\", \"ALT\", \"BILT\" in the last 168 hours.    Invalid input(s): \"MAG\"  No results for input(s): \"PT\", \"INR\", \"PTT\", \"FIB\" in the last 168 hours.    Radiographic Studies:  US BREAST BILATERAL COMPLETE (CPT=76641-50)    Result Date: 3/24/2025  CONCLUSION:   BI-RADS CATEGORY: DIAGNOSTIC CATEGORY 1 - NEGATIVE ASSESSMENT.   RECOMMENDATIONS:  ROUTINE MAMMOGRAM AND CLINICAL EVALUATION IN 12 MONTHS.   LOCATION:  [place]     Dictated by ([facility]): Fidel Arroyo MD on 3/24/2025 at 10:02 AM     Finalized by ([facility]): Fidel Arroyo MD on 3/24/2025 at 10:04 AM       [facility] MIREYA 2D+3D SCREENING BILAT (CPT=77067/77117)    Result Date: 3/11/2025  CONCLUSION:  No suspicious change from prior mammography.  No mammographic evidence of malignancy.  BI-RADS CATEGORY:  DIAGNOSTIC CATEGORY 1 - NEGATIVE ASSESSMENT.   RECOMMENDATIONS:  ROUTINE MAMMOGRAM AND CLINICAL EVALUATION IN 12 MONTHS.   Because of breast density, this patient may benefit from supplemental screening with breast MRI, Molecular Breast Imaging (MBI) or bilateral whole breast ultrasound for increased sensitivity for detection of cancer which can be obscured mammographically.   Please contact your ordering provider to discuss supplemental screening options.    A letter explaining the results in lay terms has been sent to the patient.  This exam was evaluated with a computer-aided device.  This patient's information has been entered into a reminder system with a target due date for the next mammogram.   LOCATION:  [facility]   Dictated by ([facility]): Carolina De La Cruz MD on 3/11/2025 at 4:51 AM     Finalized by ([facility]): Carolina De La Cruz MD on 3/11/2025 at 4:54 AM   [place] [id number] [phone]    XR CHEST PA + LAT CHEST (CPT=71046)    Result Date: 1/27/2025  CONCLUSION:  No acute disease.    LOCATION:  [facility]   Dictated by ([facility]): rAden Yuen MD on  1/27/2025 at 12:39 PM     Finalized by ([facility]): Arden Yuen MD on 1/27/2025 at 12:40 PM            Impression/Recommendations:  Mrs. Brown is a 45 y/o F with PMHx of iron deficiency anemia and hypothyroidism who presents to our office for treatment recommendations of iron deficiency anemia.     Iron Deficiency Anemia  She endorses generalized fatigue- constantly. Colonoscopy 2022 was normal. Denies melena, hematochezia, or rectal bleeding. No dyspnea with exertion, or lightheadedness/dizziness with positional changes. Her periods are regular. No rectal bleeding or blood in stool reported.     Iron studies 3/8/25 with ferritin 8 ng/mL, Hb 12.1, plt 277, MCV 91, B12 316.  4/4/25: folate 23.4    Recommendations:  - continue daily B12 supplements    Patient is symptomatic from severe iron deficiency anemia (8 ng/mL), as per H&P and assessment above. Patient will benefit from IV iron, with a targeted goal of ferritin 50 ng/mL. Today, I discussed with the patient her diagnosis of iron deficiency anemia, and the proposed treatment of IV iron. Benefits and risks were discussed in detail. Written information was also provided. After thorough discussion, patient has consented to start IV Iron Dextran (1 dose, 1000 mg, IV over 1 hour -- with 25 mg test dose to be given prior to infusion).    Orders were placed for IV Iron Dextran. Patient will schedule appointment for IV iron infusion once insurance approval is obtained.      Follow Up: 4-6 weeks after IV iron infusion is completed for labs and visit with me. We will re-check iron studies at that time.     Sandi Dos Santos D.O.  [facility]        Note to patient: The 21 Century Cures Act makes medical notes like these available to patients in the interest of transparency. However, be advised this is a medical document. It is intended as peer to peer communication. It is written in medical language and may contain abbreviations or verbiage  that are unfamiliar. It may appear blunt or direct. Medical documents are intended to carry relevant information, facts as evident, and the clinical opinion of the practitioner.       In reviewing this note, please be advised that Dragon Voice Recognition software used to dictate the note may have made errors in recognizing some of the words or phrases.          [1]   Current Outpatient Medications on File Prior to Visit   Medication Sig Dispense Refill    levothyroxine 88 MCG Oral Tab Take 1 tablet (88 mcg total) by mouth before breakfast. 90 tablet 1    Vitamin B-12 (VITAMIN B12) 500 MCG Oral Tab Take 1 tablet (500 mcg total) by mouth daily.      ergocalciferol 1.25 MG (37959 UT) Oral Cap Take 1 capsule (50,000 Units total) by mouth once a week. Once weekly x 20 weeks.  Take with food (Patient not taking: Reported on 4/4/2025) 12 capsule 2    Ferric Maltol (ACCRUFER) 30 MG Oral Cap Take 1 capsule by mouth daily. (Patient not taking: Reported on 4/4/2025)       No current facility-administered medications on file prior to visit.   [2] No Known Allergies

## 2025-04-04 NOTE — PATIENT INSTRUCTIONS
Hello,     You were seen today for your diagnosis of iron deficiency anemia. Your ferritin (storage of iron) is low and you need IV iron.     I have ordered 1 dose of IV Iron Dextran (INFeD) for you. Please schedule this once insurance approves the IV iron (our team will call you to make the appointment).    We attached some information about iron-rich foods (kale, spinach, dark/leafy vegetables). Please try to incorporate these into your diet.     Please schedule a follow up appointment to see me again 4-6 weeks after your infusion. We will repeat iron studies (labs) at this time to see if you absorbed the iron appropriately.     Thank you!     It was a pleasure meeting you.  Dr. Dos Santos

## 2025-04-04 NOTE — PROGRESS NOTES
Pt here for new consult regarding iron deficiency anemia. Medications and medical history reviewed. Pt reports she is very fatigued. No dyspnea with exertion, or lightheadedness/dizziness with positional changes. Her periods are regular. No rectal bleeding or blood in stool reported. She had colonoscopy completed 2022. No previous IV iron/blood transfusions. She was prescribed oral iron tablets, reports she has not started them yet.         Education Record    Learner:  Patient    Disease / Diagnosis: iron deficiency anemia    Barriers / Limitations:  None   Comments:    Method:  Discussion   Comments:    General Topics:  Medication, Side effects and symptom management, and Plan of care reviewed   Comments:    Outcome:  Observed demonstration and Shows understanding   Comments:

## 2025-04-08 LAB — SOL TRANSFERRIN RECEPTOR: 22.1 NMOL/L

## 2025-04-11 ENCOUNTER — OFFICE VISIT (OUTPATIENT)
Age: 45
End: 2025-04-11
Attending: INTERNAL MEDICINE
Payer: COMMERCIAL

## 2025-04-11 VITALS
BODY MASS INDEX: 24.98 KG/M2 | HEART RATE: 103 BPM | WEIGHT: 141 LBS | DIASTOLIC BLOOD PRESSURE: 72 MMHG | TEMPERATURE: 99 F | OXYGEN SATURATION: 99 % | RESPIRATION RATE: 18 BRPM | HEIGHT: 62.99 IN | SYSTOLIC BLOOD PRESSURE: 122 MMHG

## 2025-04-11 DIAGNOSIS — D50.0 IRON DEFICIENCY ANEMIA DUE TO CHRONIC BLOOD LOSS: Primary | ICD-10-CM

## 2025-04-11 DIAGNOSIS — D50.0 IRON DEFICIENCY ANEMIA DUE TO CHRONIC BLOOD LOSS: ICD-10-CM

## 2025-04-11 NOTE — PROGRESS NOTES
Education Record    Learner:  Patient    Pt here for: IV iron    Barriers / Limitations:  None    Method:  Brief focused, printed material and  reinforcement    General Topics:  Plan of care reviewed    Outcome: Pt tolerated infed infusion with no c/o. Test dose administered today as well. Left in stable condition. Appt in 1 month with MD.

## 2025-05-03 DIAGNOSIS — E03.9 HYPOTHYROIDISM, UNSPECIFIED TYPE: ICD-10-CM

## 2025-05-05 RX ORDER — LEVOTHYROXINE SODIUM 88 UG/1
88 TABLET ORAL
Qty: 90 TABLET | Refills: 1 | Status: SHIPPED | OUTPATIENT
Start: 2025-05-05

## 2025-05-05 RX ORDER — ERGOCALCIFEROL 1.25 MG/1
50000 CAPSULE, LIQUID FILLED ORAL WEEKLY
Qty: 12 CAPSULE | Refills: 2 | Status: SHIPPED | OUTPATIENT
Start: 2025-05-05

## 2025-05-05 NOTE — TELEPHONE ENCOUNTER
A refill request was received for:  Requested Prescriptions     Pending Prescriptions Disp Refills    ergocalciferol 1.25 MG (26118 UT) Oral Cap 12 capsule 2     Sig: Take 1 capsule (50,000 Units total) by mouth once a week. Once weekly x 20 weeks.  Take with food    levothyroxine 88 MCG Oral Tab 90 tablet 1     Sig: Take 1 tablet (88 mcg total) by mouth before breakfast.       Last refill date:     Ergocalciferol 3/17/25  Levothyroxine 3/17/25    Last office visit: 3/17/25    Follow up due:  Future Appointments   Date Time Provider Department Center   5/9/2025 11:15 AM PF OOT PF [facility]   5/9/2025 11:30 AM Sandi Dos Santos DO PF [facility]   9/19/2025  8:00 AM Harmony Avina DO [facility] [address] [facility] [address]

## 2025-05-09 ENCOUNTER — APPOINTMENT (OUTPATIENT)
Age: 45
End: 2025-05-09
Attending: INTERNAL MEDICINE
Payer: COMMERCIAL

## 2025-05-23 ENCOUNTER — NURSE ONLY (OUTPATIENT)
Age: 45
End: 2025-05-23
Attending: INTERNAL MEDICINE
Payer: COMMERCIAL

## 2025-05-23 ENCOUNTER — OFFICE VISIT (OUTPATIENT)
Age: 45
End: 2025-05-23
Attending: INTERNAL MEDICINE
Payer: COMMERCIAL

## 2025-05-23 VITALS
HEART RATE: 74 BPM | DIASTOLIC BLOOD PRESSURE: 64 MMHG | WEIGHT: 141.5 LBS | RESPIRATION RATE: 18 BRPM | TEMPERATURE: 97 F | OXYGEN SATURATION: 100 % | HEIGHT: 62.99 IN | SYSTOLIC BLOOD PRESSURE: 106 MMHG | BODY MASS INDEX: 25.07 KG/M2

## 2025-05-23 DIAGNOSIS — D50.0 IRON DEFICIENCY ANEMIA DUE TO CHRONIC BLOOD LOSS: ICD-10-CM

## 2025-05-23 DIAGNOSIS — D50.0 IRON DEFICIENCY ANEMIA DUE TO CHRONIC BLOOD LOSS: Primary | ICD-10-CM

## 2025-05-23 LAB
ALBUMIN SERPL-MCNC: 4.8 G/DL (ref 3.2–4.8)
ALBUMIN/GLOB SERPL: 2.2 {RATIO} (ref 1–2)
ALP LIVER SERPL-CCNC: 63 U/L (ref 37–98)
ALT SERPL-CCNC: 14 U/L (ref 10–49)
ANION GAP SERPL CALC-SCNC: 6 MMOL/L (ref 0–18)
AST SERPL-CCNC: 17 U/L (ref ?–34)
BASOPHILS # BLD AUTO: 0.01 X10(3) UL (ref 0–0.2)
BASOPHILS NFR BLD AUTO: 0.2 %
BILIRUB SERPL-MCNC: 0.5 MG/DL (ref 0.3–1.2)
BUN BLD-MCNC: 7 MG/DL (ref 9–23)
CALCIUM BLD-MCNC: 9.6 MG/DL (ref 8.7–10.6)
CHLORIDE SERPL-SCNC: 106 MMOL/L (ref 98–112)
CO2 SERPL-SCNC: 28 MMOL/L (ref 21–32)
CREAT BLD-MCNC: 0.7 MG/DL (ref 0.55–1.02)
DEPRECATED HBV CORE AB SER IA-ACNC: 181 NG/ML (ref 50–306)
EGFRCR SERPLBLD CKD-EPI 2021: 110 ML/MIN/1.73M2 (ref 60–?)
EOSINOPHIL # BLD AUTO: 0.16 X10(3) UL (ref 0–0.7)
EOSINOPHIL NFR BLD AUTO: 3.6 %
ERYTHROCYTE [DISTWIDTH] IN BLOOD BY AUTOMATED COUNT: 13.6 %
GLOBULIN PLAS-MCNC: 2.2 G/DL (ref 2–3.5)
GLUCOSE BLD-MCNC: 70 MG/DL (ref 70–99)
HCT VFR BLD AUTO: 39.5 % (ref 35–48)
HGB BLD-MCNC: 13.2 G/DL (ref 12–16)
IMM GRANULOCYTES # BLD AUTO: 0.01 X10(3) UL (ref 0–1)
IMM GRANULOCYTES NFR BLD: 0.2 %
IRON SATN MFR SERPL: 39 % (ref 15–50)
IRON SERPL-MCNC: 98 UG/DL (ref 50–170)
LYMPHOCYTES # BLD AUTO: 1.53 X10(3) UL (ref 1–4)
LYMPHOCYTES NFR BLD AUTO: 34.5 %
MCH RBC QN AUTO: 29.9 PG (ref 26–34)
MCHC RBC AUTO-ENTMCNC: 33.4 G/DL (ref 31–37)
MCV RBC AUTO: 89.4 FL (ref 80–100)
MONOCYTES # BLD AUTO: 0.35 X10(3) UL (ref 0.1–1)
MONOCYTES NFR BLD AUTO: 7.9 %
NEUTROPHILS # BLD AUTO: 2.38 X10 (3) UL (ref 1.5–7.7)
NEUTROPHILS # BLD AUTO: 2.38 X10(3) UL (ref 1.5–7.7)
NEUTROPHILS NFR BLD AUTO: 53.6 %
OSMOLALITY SERPL CALC.SUM OF ELEC: 286 MOSM/KG (ref 275–295)
PLATELET # BLD AUTO: 231 10(3)UL (ref 150–450)
POTASSIUM SERPL-SCNC: 4.5 MMOL/L (ref 3.5–5.1)
PROT SERPL-MCNC: 7 G/DL (ref 5.7–8.2)
RBC # BLD AUTO: 4.42 X10(6)UL (ref 3.8–5.3)
SODIUM SERPL-SCNC: 140 MMOL/L (ref 136–145)
TOTAL IRON BINDING CAPACITY: 252 UG/DL (ref 250–425)
TRANSFERRIN SERPL-MCNC: 182 MG/DL (ref 250–380)
VIT B12 SERPL-MCNC: 427 PG/ML (ref 211–911)
WBC # BLD AUTO: 4.4 X10(3) UL (ref 4–11)

## 2025-05-23 NOTE — PROGRESS NOTES
[facility] Return Visit Note    Patient Name: Audra Brown  Medical Record Number: [id number]    YOB: 1980   Date of Service: 25     Reason for Consultation/Chief Complaint:  iron deficiency anemia  Physician requesting consultation: Harmony Avina DO    TODAY 25  Patient presents for follow up after receiving IV iron for YAA.   25 - IV iron dextran 1000 mg given.   Overall, her energy has indeed improved.   She denies rectal bleeding, dizziness, or lightheadedness.  She has been compliant with B12 supplements.  Has chronic constipation which is controlled with herbal supplements.  Has been taking oral iron supplements.       History of Present Illness:  Mrs. Brown is a 43 y/o F with PMHx of iron deficiency anemia and hypothyroidism who presents to our office for treatment recommendations of iron deficiency anemia.     She endorses generalized fatigue- constantly. Colonoscopy  was normal. Denies melena, hematochezia, or rectal bleeding. No dyspnea with exertion, or lightheadedness/dizziness with positional changes. Her periods are regular. No rectal bleeding or blood in stool reported.     Iron studies 3/8/25 with ferritin 8 ng/mL, Hb 12.1, plt 277, MCV 91.      Past Medical History:  Past Medical History:    Back pain    Not sure    Bloating    It depends on what i eat and befor my periods    Constipation    Fatigue    Flatulence/gas pain/belching    It depends on what i eat    Frequent use of laxatives    Senna leaves powder    Heartburn    Occasional; depends on eating    Iron deficiency anemia due to chronic blood loss    Thyroid disease       Past Surgical History:  Past Surgical History:   Procedure Laterality Date      2008/ 2012    x3       Current Outpatient Medications:  Medications Ordered Prior to Encounter[1]    Allergies:   Allergies[2]    Family Medical History:  Family History   Problem Relation Age of Onset    Heart Attack  Father     Hypertension Father     Heart Disorder Father         2 heart attacks    Heart Disorder Mother         2 minor attacks    Hypertension Mother     Prostate Cancer Maternal Grandfather     Hypertension Paternal Grandmother     Hypertension Paternal Grandfather     Heart Disorder Paternal Grandfather         CAD       Social History:  Social History     Social History Narrative    Not on file     Social History     Socioeconomic History    Marital status:    Tobacco Use    Smoking status: Never    Smokeless tobacco: Never   Substance and Sexual Activity    Alcohol use: Yes    Drug use: No   Other Topics Concern    Caffeine Concern Yes    Exercise Yes     Social Drivers of Health      [facility]    Housing Stability       Review of Systems:  A 10-point ROS was done with pertinent positives and negatives per the HPI.     ECOG Performance Status: 0    Vital Signs:  Height: 160 cm (5' 2.99\") (05/23 0923)  Weight: 64.2 kg (141 lb 8 oz) (05/23 0923)  BSA (Calculated - sq m): 1.67 sq meters (05/23 0923)  Pulse: 74 (05/23 0923)  BP: 106/64 (05/23 0923)  Temp: 96.9 °F (36.1 °C) (05/23 0923)  Do Not Use - Resp Rate: --  SpO2: 100 % (05/23 0923)  Wt Readings from Last 6 Encounters:   05/23/25 64.2 kg (141 lb 8 oz)   04/11/25 64 kg (141 lb)   04/04/25 64.6 kg (142 lb 8 oz)   03/17/25 64 kg (141 lb)   02/10/25 62.1 kg (137 lb)   01/27/25 62.1 kg (137 lb)       Physical Examination:  General: Well-nourished and well-appearing. No acute distress.  Eyes: EOMI, bilateral conjunctiva normal.   Respiratory: Lungs clear to auscultation bilaterally.  Cardiovascular: Regular rate and rhythm, no murmurs. No lower extremity edema.   Psychiatric: Normal mood and affect.    Data Review  Laboratory Studies:  Recent Labs   Lab 05/23/25 0923   WBC 4.4   HGB 13.2   HCT 39.5   .0   MCV 89.4   RDW 13.6   NEPRELIM 2.38     Recent Labs   Lab 05/23/25 0923      K 4.5      CO2 28.0    BUN 7*   CREATSERUM 0.70   GLU 70   CA 9.6   TP 7.0   ALB 4.8   ALKPHO 63   AST 17   ALT 14   BILT 0.5     No results for input(s): \"PT\", \"INR\", \"PTT\", \"FIB\" in the last 168 hours.    Radiographic Studies:  US BREAST BILATERAL COMPLETE (CPT=76641-50)  Result Date: 3/24/2025  CONCLUSION:   BI-RADS CATEGORY: DIAGNOSTIC CATEGORY 1 - NEGATIVE ASSESSMENT.   RECOMMENDATIONS:  ROUTINE MAMMOGRAM AND CLINICAL EVALUATION IN 12 MONTHS.   LOCATION:  [place]     Dictated by (CST): Fidel Arroyo MD on 3/24/2025 at 10:02 AM     Finalized by (CST): Fidel Arroyo MD on 3/24/2025 at 10:04 AM       [facility] MIREYA 2D+3D SCREENING BILAT (CPT=77067/89224)  Result Date: 3/11/2025  CONCLUSION:  No suspicious change from prior mammography.  No mammographic evidence of malignancy.  BI-RADS CATEGORY:  DIAGNOSTIC CATEGORY 1 - NEGATIVE ASSESSMENT.   RECOMMENDATIONS:  ROUTINE MAMMOGRAM AND CLINICAL EVALUATION IN 12 MONTHS.   Because of breast density, this patient may benefit from supplemental screening with breast MRI, Molecular Breast Imaging (MBI) or bilateral whole breast ultrasound for increased sensitivity for detection of cancer which can be obscured mammographically.   Please contact your ordering provider to discuss supplemental screening options.    A letter explaining the results in lay terms has been sent to the patient.  This exam was evaluated with a computer-aided device.  This patient's information has been entered into a reminder system with a target due date for the next mammogram.   LOCATION:  [facility]   Dictated by ([facility]): Carolina De La Cruz MD on 3/11/2025 at 4:51 AM     Finalized by (CST): Carolina De La Cruz MD on 3/11/2025 at 4:54 [place] [address] [phone]        Impression/Recommendations:  Mrs. Brown is a 43 y/o F with PMHx of iron deficiency anemia and hypothyroidism who presents to our office for treatment recommendations of iron deficiency anemia.     Iron Deficiency  Anemia  She endorses generalized fatigue- constantly. Colonoscopy 2022 was normal. Denies melena, hematochezia, or rectal bleeding. No dyspnea with exertion, or lightheadedness/dizziness with positional changes. Her periods are regular. No rectal bleeding or blood in stool reported.     Iron studies 3/8/25 with ferritin 8 ng/mL, Hb 12.1, plt 277, MCV 91, B12 316.  4/4/25: folate 23.4    4/11/25 - IV iron dextran 1000 mg given.       TODAY 5/23/25  Patient presents for follow up after receiving IV iron for YAA.   Overall, her energy has indeed improved.   She denies rectal bleeding, dizziness, or lightheadedness.  She has been compliant with B12 supplements.  Has chronic constipation which is controlled with herbal supplements.  Has been taking oral iron supplements.     Hb improved to 13.2 g/dL from 12.9 g/dL.   Iron studies from today pending.     Recommendations:  - continue daily B12 supplements for now (will follow up level from today and she if she can discontinue)  - follow up iron studies from today  - discontinue oral iron. Patient received IV iron and will not further absorb more iron at this time      Follow Up: 6 months for follow up with me. Labs (CBC, iron studies, B12) to be done at [facility] 1 week prior.     Sandi Dos Santos D.O.  [facility]        Note to patient: The 21 Century Cures Act makes medical notes like these available to patients in the interest of transparency. However, be advised this is a medical document. It is intended as peer to peer communication. It is written in medical language and may contain abbreviations or verbiage that are unfamiliar. It may appear blunt or direct. Medical documents are intended to carry relevant information, facts as evident, and the clinical opinion of the practitioner.       In reviewing this note, please be advised that Dragon Voice Recognition software used to dictate the note may have made errors in recognizing some of  the words or phrases.          [1]   Current Outpatient Medications on File Prior to Visit   Medication Sig Dispense Refill    ergocalciferol 1.25 MG (64140 UT) Oral Cap Take 1 capsule (50,000 Units total) by mouth once a week. Once weekly x 20 weeks.  Take with food 12 capsule 2    levothyroxine 88 MCG Oral Tab Take 1 tablet (88 mcg total) by mouth before breakfast. 90 tablet 1    Ferric Maltol (ACCRUFER) 30 MG Oral Cap Take 1 capsule by mouth daily. (Patient not taking: Reported on 4/4/2025)      Vitamin B-12 (VITAMIN B12) 500 MCG Oral Tab Take 1 tablet (500 mcg total) by mouth daily.       No current facility-administered medications on file prior to visit.   [2] No Known Allergies

## 2025-05-23 NOTE — PROGRESS NOTES
Pt here for f/u regarding iron deficiency anemia. Pt received IV iron 4/11/2025. Pt states her energy levels have improved. No lightheadedness/dizziness. No rectal bleeding or blood in the stool. She has been compliant with her vitamin b12 supplements. She is taking OTC iron supplements without issue.       Education Record    Learner:  Patient    Disease / Diagnosis: iron deficiency anemia    Barriers / Limitations:  None   Comments:    Method:  Discussion   Comments:    General Topics:  Medication, Pain, Side effects and symptom management, and Plan of care reviewed   Comments:    Outcome:  Observed demonstration and Shows understanding   Comments:

## (undated) DIAGNOSIS — D64.9 ANEMIA, UNSPECIFIED TYPE: ICD-10-CM

## (undated) DIAGNOSIS — E03.9 HYPOTHYROIDISM, UNSPECIFIED TYPE: Primary | ICD-10-CM

## (undated) DIAGNOSIS — D64.9 ANEMIA, UNSPECIFIED TYPE: Primary | ICD-10-CM

## (undated) DIAGNOSIS — E03.9 HYPOTHYROIDISM, UNSPECIFIED TYPE: ICD-10-CM

## (undated) DIAGNOSIS — E61.1 LOW SERUM IRON: ICD-10-CM

## (undated) DIAGNOSIS — R79.89 ABNORMAL CBC: Primary | ICD-10-CM

## (undated) DIAGNOSIS — E03.8 OTHER SPECIFIED HYPOTHYROIDISM: Primary | ICD-10-CM

## (undated) NOTE — LETTER
10/16/17     RE: Karl Watts ( 1980)      To whom it may concern,    The above patient was seen by myself in clinic. An MRI of the brain was ordered due to an occipital headache.  We were evaluating the patient for a Arteriovenous Malformation or o

## (undated) NOTE — LETTER
07/14/17        Audra Brown  [facility]      Dear Guerrero Wells records indicate that you have outstanding lab work and or testing that was ordered for you and has not yet been completed:          Thyroid peroxidase & thyroglob